# Patient Record
Sex: FEMALE | Race: WHITE | Employment: OTHER | URBAN - METROPOLITAN AREA
[De-identification: names, ages, dates, MRNs, and addresses within clinical notes are randomized per-mention and may not be internally consistent; named-entity substitution may affect disease eponyms.]

---

## 2017-04-19 PROBLEM — Z96.1 PSEUDOPHAKIA, BOTH EYES: Status: ACTIVE | Noted: 2017-04-19

## 2020-06-25 PROBLEM — H35.3131 EARLY DRY STAGE NONEXUDATIVE AGE-RELATED MACULAR DEGENERATION OF BOTH EYES: Status: ACTIVE | Noted: 2020-06-25

## 2022-01-27 ENCOUNTER — APPOINTMENT (OUTPATIENT)
Dept: CT IMAGING | Facility: HOSPITAL | Age: 87
End: 2022-01-27
Attending: EMERGENCY MEDICINE
Payer: MEDICARE

## 2022-01-27 ENCOUNTER — HOSPITAL ENCOUNTER (EMERGENCY)
Facility: HOSPITAL | Age: 87
Discharge: HOME | End: 2022-01-27
Attending: EMERGENCY MEDICINE
Payer: MEDICARE

## 2022-01-27 ENCOUNTER — APPOINTMENT (OUTPATIENT)
Dept: GENERAL RADIOLOGY | Facility: HOSPITAL | Age: 87
End: 2022-01-27
Attending: EMERGENCY MEDICINE
Payer: MEDICARE

## 2022-01-27 VITALS
SYSTOLIC BLOOD PRESSURE: 159 MMHG | OXYGEN SATURATION: 99 % | TEMPERATURE: 98 F | WEIGHT: 160 LBS | HEART RATE: 83 BPM | HEIGHT: 60 IN | RESPIRATION RATE: 17 BRPM | DIASTOLIC BLOOD PRESSURE: 78 MMHG | BODY MASS INDEX: 31.41 KG/M2

## 2022-01-27 DIAGNOSIS — R41.82 ALTERED MENTAL STATUS, UNSPECIFIED ALTERED MENTAL STATUS TYPE: Primary | ICD-10-CM

## 2022-01-27 DIAGNOSIS — H61.23 BILATERAL IMPACTED CERUMEN: ICD-10-CM

## 2022-01-27 LAB
ALBUMIN SERPL-MCNC: 3.7 G/DL (ref 3.4–5)
ALBUMIN/GLOB SERPL: 1 {RATIO} (ref 1–2)
ALP LIVER SERPL-CCNC: 87 U/L
ALT SERPL-CCNC: 21 U/L
ANION GAP SERPL CALC-SCNC: 4 MMOL/L (ref 0–18)
APTT PPP: 31.6 SECONDS (ref 23.3–35.6)
AST SERPL-CCNC: 17 U/L (ref 15–37)
BASOPHILS # BLD AUTO: 0.02 X10(3) UL (ref 0–0.2)
BASOPHILS NFR BLD AUTO: 0.4 %
BILIRUB SERPL-MCNC: 0.6 MG/DL (ref 0.1–2)
BILIRUB UR QL STRIP.AUTO: NEGATIVE
BUN BLD-MCNC: 18 MG/DL (ref 7–18)
CALCIUM BLD-MCNC: 9.8 MG/DL (ref 8.5–10.1)
CHLORIDE SERPL-SCNC: 106 MMOL/L (ref 98–112)
CLARITY UR REFRACT.AUTO: CLEAR
CO2 SERPL-SCNC: 27 MMOL/L (ref 21–32)
COLOR UR AUTO: YELLOW
CREAT BLD-MCNC: 0.95 MG/DL
EOSINOPHIL # BLD AUTO: 0.06 X10(3) UL (ref 0–0.7)
EOSINOPHIL NFR BLD AUTO: 1.2 %
ERYTHROCYTE [DISTWIDTH] IN BLOOD BY AUTOMATED COUNT: 13.2 %
GLOBULIN PLAS-MCNC: 3.6 G/DL (ref 2.8–4.4)
GLUCOSE BLD-MCNC: 140 MG/DL (ref 70–99)
GLUCOSE UR STRIP.AUTO-MCNC: NEGATIVE MG/DL
HCT VFR BLD AUTO: 40.6 %
HGB BLD-MCNC: 13.3 G/DL
IMM GRANULOCYTES # BLD AUTO: 0.02 X10(3) UL (ref 0–1)
IMM GRANULOCYTES NFR BLD: 0.4 %
INR BLD: 1.05 (ref 0.8–1.2)
KETONES UR STRIP.AUTO-MCNC: NEGATIVE MG/DL
LEUKOCYTE ESTERASE UR QL STRIP.AUTO: NEGATIVE
LYMPHOCYTES # BLD AUTO: 0.76 X10(3) UL (ref 1–4)
LYMPHOCYTES NFR BLD AUTO: 14.8 %
MCH RBC QN AUTO: 30.6 PG (ref 26–34)
MCHC RBC AUTO-ENTMCNC: 32.8 G/DL (ref 31–37)
MCV RBC AUTO: 93.5 FL
MONOCYTES # BLD AUTO: 0.58 X10(3) UL (ref 0.1–1)
MONOCYTES NFR BLD AUTO: 11.3 %
NEUTROPHILS # BLD AUTO: 3.68 X10 (3) UL (ref 1.5–7.7)
NEUTROPHILS # BLD AUTO: 3.68 X10(3) UL (ref 1.5–7.7)
NEUTROPHILS NFR BLD AUTO: 71.9 %
NITRITE UR QL STRIP.AUTO: NEGATIVE
OSMOLALITY SERPL CALC.SUM OF ELEC: 288 MOSM/KG (ref 275–295)
PH UR STRIP.AUTO: 6 [PH] (ref 5–8)
PLATELET # BLD AUTO: 225 10(3)UL (ref 150–450)
POTASSIUM SERPL-SCNC: 4.1 MMOL/L (ref 3.5–5.1)
PROT SERPL-MCNC: 7.3 G/DL (ref 6.4–8.2)
PROT UR STRIP.AUTO-MCNC: NEGATIVE MG/DL
PROTHROMBIN TIME: 13.7 SECONDS (ref 11.6–14.8)
RBC # BLD AUTO: 4.34 X10(6)UL
RBC UR QL AUTO: NEGATIVE
SARS-COV-2 RNA RESP QL NAA+PROBE: NOT DETECTED
SODIUM SERPL-SCNC: 137 MMOL/L (ref 136–145)
SP GR UR STRIP.AUTO: 1.02 (ref 1–1.03)
UROBILINOGEN UR STRIP.AUTO-MCNC: <2 MG/DL
WBC # BLD AUTO: 5.1 X10(3) UL (ref 4–11)

## 2022-01-27 PROCEDURE — 70450 CT HEAD/BRAIN W/O DYE: CPT | Performed by: EMERGENCY MEDICINE

## 2022-01-27 PROCEDURE — 99284 EMERGENCY DEPT VISIT MOD MDM: CPT

## 2022-01-27 PROCEDURE — 36415 COLL VENOUS BLD VENIPUNCTURE: CPT

## 2022-01-27 PROCEDURE — 85025 COMPLETE CBC W/AUTO DIFF WBC: CPT | Performed by: EMERGENCY MEDICINE

## 2022-01-27 PROCEDURE — 85610 PROTHROMBIN TIME: CPT | Performed by: EMERGENCY MEDICINE

## 2022-01-27 PROCEDURE — 80053 COMPREHEN METABOLIC PANEL: CPT | Performed by: EMERGENCY MEDICINE

## 2022-01-27 PROCEDURE — 71045 X-RAY EXAM CHEST 1 VIEW: CPT | Performed by: EMERGENCY MEDICINE

## 2022-01-27 PROCEDURE — 69209 REMOVE IMPACTED EAR WAX UNI: CPT

## 2022-01-27 PROCEDURE — 85730 THROMBOPLASTIN TIME PARTIAL: CPT | Performed by: EMERGENCY MEDICINE

## 2022-01-27 PROCEDURE — 81003 URINALYSIS AUTO W/O SCOPE: CPT | Performed by: EMERGENCY MEDICINE

## 2022-01-27 NOTE — ED QUICK NOTES
Pt reevaluated by er physician. Pt informed pt's kimberly Jessica Spears (on the phone) of pt's findings and plan of care.  Both verbalizing understanding

## 2022-01-27 NOTE — ED PROVIDER NOTES
Patient Seen in: [facility] Emergency Department      History   Patient presents with:  Altered Mental Status    Stated Complaint: AHMET [address] [phone]    Subjective:   HPI    Patient is a 51-year-old female presents from tobacco: Never Used    Alcohol use: Not on file    Drug use: Not on file             Review of Systems    Positive for stated complaint: GWEN ROJO HERNANDEZ [address] [phone]  Other systems are as noted in HPI.   Constitutional and vital signs rev person. Motor strength is 5 over 5 in all 4 extremities. There are no gross motor or sensory deficits appreciated. Cranial nerves II through XII are intact. Patient is answering all questions appropriately.              ED Course     Labs Reviewed   COMP ME vessel ischemic disease. Vascular calcifications are noted. No mass effect. Visualized portions of paranasal sinuses are unremarkable. Visualized portions of the mastoid air cells are unremarkable. Visualized portions of the orbits are unremarkable.  Avelino Prado BUN/creatinine, and blood sugar all of which are unremarkable. Liver function tests are unremarkable. Patient's urinalysis is unremarkable. Patient's coags are unremarkable. The patient's Covid test is found to be negative.  Patient was sitting up in breath [address] [facility]  [phone]    Call in 2 days            Medications Prescribed:  Current Discharge Medication List

## 2022-01-27 NOTE — ED QUICK NOTES
Pt's both ears irrigated with saline and peroxide. Wax removed from both and pt verbalized she can hear better.

## 2022-01-27 NOTE — ED QUICK NOTES
Pt reevaluated by dr. Jaylen Sawyer, pt's son at bedside. Pt for discharge.  Plan of care explained to both and verbalizing understanding

## 2022-12-26 ENCOUNTER — HOSPITAL ENCOUNTER (EMERGENCY)
Facility: HOSPITAL | Age: 87
Discharge: HOME | End: 2022-12-26
Attending: EMERGENCY MEDICINE
Payer: MEDICARE

## 2022-12-26 ENCOUNTER — APPOINTMENT (OUTPATIENT)
Dept: CT IMAGING | Facility: HOSPITAL | Age: 87
End: 2022-12-26
Attending: EMERGENCY MEDICINE
Payer: MEDICARE

## 2022-12-26 VITALS
BODY MASS INDEX: 28.32 KG/M2 | RESPIRATION RATE: 20 BRPM | HEIGHT: 61 IN | HEART RATE: 87 BPM | WEIGHT: 150 LBS | TEMPERATURE: 98 F | DIASTOLIC BLOOD PRESSURE: 71 MMHG | OXYGEN SATURATION: 100 % | SYSTOLIC BLOOD PRESSURE: 150 MMHG

## 2022-12-26 DIAGNOSIS — S00.03XA HEMATOMA OF SCALP, INITIAL ENCOUNTER: ICD-10-CM

## 2022-12-26 DIAGNOSIS — R19.7 DIARRHEA, UNSPECIFIED TYPE: Primary | ICD-10-CM

## 2022-12-26 DIAGNOSIS — E86.0 MILD DEHYDRATION: ICD-10-CM

## 2022-12-26 LAB
ALBUMIN SERPL-MCNC: 3.6 G/DL (ref 3.4–5)
ALBUMIN/GLOB SERPL: 1.1 {RATIO} (ref 1–2)
ALP LIVER SERPL-CCNC: 120 U/L
ALT SERPL-CCNC: 17 U/L
ANION GAP SERPL CALC-SCNC: 2 MMOL/L (ref 0–18)
AST SERPL-CCNC: 28 U/L (ref 15–37)
BASOPHILS # BLD AUTO: 0.03 X10(3) UL (ref 0–0.2)
BASOPHILS NFR BLD AUTO: 0.4 %
BILIRUB SERPL-MCNC: 0.4 MG/DL (ref 0.1–2)
BUN BLD-MCNC: 22 MG/DL (ref 7–18)
CALCIUM BLD-MCNC: 9.1 MG/DL (ref 8.5–10.1)
CHLORIDE SERPL-SCNC: 109 MMOL/L (ref 98–112)
CO2 SERPL-SCNC: 28 MMOL/L (ref 21–32)
CREAT BLD-MCNC: 1.04 MG/DL
EOSINOPHIL # BLD AUTO: 0.04 X10(3) UL (ref 0–0.7)
EOSINOPHIL NFR BLD AUTO: 0.5 %
ERYTHROCYTE [DISTWIDTH] IN BLOOD BY AUTOMATED COUNT: 12.9 %
GFR SERPLBLD BASED ON 1.73 SQ M-ARVRAT: 52 ML/MIN/1.73M2 (ref 60–?)
GLOBULIN PLAS-MCNC: 3.4 G/DL (ref 2.8–4.4)
GLUCOSE BLD-MCNC: 87 MG/DL (ref 70–99)
HCT VFR BLD AUTO: 42.6 %
HGB BLD-MCNC: 13.8 G/DL
IMM GRANULOCYTES # BLD AUTO: 0.02 X10(3) UL (ref 0–1)
IMM GRANULOCYTES NFR BLD: 0.3 %
LYMPHOCYTES # BLD AUTO: 0.68 X10(3) UL (ref 1–4)
LYMPHOCYTES NFR BLD AUTO: 8.5 %
MCH RBC QN AUTO: 31.2 PG (ref 26–34)
MCHC RBC AUTO-ENTMCNC: 32.4 G/DL (ref 31–37)
MCV RBC AUTO: 96.2 FL
MONOCYTES # BLD AUTO: 0.86 X10(3) UL (ref 0.1–1)
MONOCYTES NFR BLD AUTO: 10.8 %
NEUTROPHILS # BLD AUTO: 6.37 X10 (3) UL (ref 1.5–7.7)
NEUTROPHILS # BLD AUTO: 6.37 X10(3) UL (ref 1.5–7.7)
NEUTROPHILS NFR BLD AUTO: 79.5 %
OSMOLALITY SERPL CALC.SUM OF ELEC: 291 MOSM/KG (ref 275–295)
PLATELET # BLD AUTO: 196 10(3)UL (ref 150–450)
POTASSIUM SERPL-SCNC: 4.2 MMOL/L (ref 3.5–5.1)
PROT SERPL-MCNC: 7 G/DL (ref 6.4–8.2)
RBC # BLD AUTO: 4.43 X10(6)UL
SODIUM SERPL-SCNC: 139 MMOL/L (ref 136–145)
WBC # BLD AUTO: 8 X10(3) UL (ref 4–11)

## 2022-12-26 PROCEDURE — 70450 CT HEAD/BRAIN W/O DYE: CPT | Performed by: EMERGENCY MEDICINE

## 2022-12-26 PROCEDURE — 99284 EMERGENCY DEPT VISIT MOD MDM: CPT

## 2022-12-26 PROCEDURE — 96360 HYDRATION IV INFUSION INIT: CPT

## 2022-12-26 PROCEDURE — 80053 COMPREHEN METABOLIC PANEL: CPT | Performed by: EMERGENCY MEDICINE

## 2022-12-26 PROCEDURE — 85025 COMPLETE CBC W/AUTO DIFF WBC: CPT | Performed by: EMERGENCY MEDICINE

## 2022-12-26 RX ORDER — LOPERAMIDE HYDROCHLORIDE 2 MG/1
2 TABLET ORAL AS NEEDED
Qty: 20 TABLET | Refills: 0 | Status: SHIPPED | OUTPATIENT
Start: 2022-12-26 | End: 2023-01-25

## 2022-12-26 RX ORDER — LOPERAMIDE HYDROCHLORIDE 2 MG/1
2 CAPSULE ORAL ONCE
Status: COMPLETED | OUTPATIENT
Start: 2022-12-26 | End: 2022-12-26

## 2022-12-26 NOTE — ED INITIAL ASSESSMENT (HPI)
Diarrhea x 1 episode PTA.  Pt states \" I feel weak, I can't walk\" denies any abdominal pain, or other complaints

## 2022-12-29 ENCOUNTER — APPOINTMENT (OUTPATIENT)
Dept: GENERAL RADIOLOGY | Facility: HOSPITAL | Age: 87
End: 2022-12-29
Attending: EMERGENCY MEDICINE
Payer: MEDICARE

## 2022-12-29 ENCOUNTER — HOSPITAL ENCOUNTER (EMERGENCY)
Facility: HOSPITAL | Age: 87
Discharge: HOME | End: 2022-12-29
Attending: EMERGENCY MEDICINE
Payer: MEDICARE

## 2022-12-29 VITALS
HEIGHT: 61 IN | BODY MASS INDEX: 28.31 KG/M2 | HEART RATE: 90 BPM | WEIGHT: 149.94 LBS | SYSTOLIC BLOOD PRESSURE: 180 MMHG | RESPIRATION RATE: 26 BRPM | DIASTOLIC BLOOD PRESSURE: 80 MMHG | OXYGEN SATURATION: 100 % | TEMPERATURE: 98 F

## 2022-12-29 DIAGNOSIS — R53.1 WEAKNESS GENERALIZED: Primary | ICD-10-CM

## 2022-12-29 LAB
ANION GAP SERPL CALC-SCNC: 6 MMOL/L (ref 0–18)
ATRIAL RATE: 89 BPM
BASOPHILS # BLD AUTO: 0.02 X10(3) UL (ref 0–0.2)
BASOPHILS NFR BLD AUTO: 0.2 %
BILIRUB UR QL CFM: NEGATIVE
BUN BLD-MCNC: 36 MG/DL (ref 7–18)
CALCIUM BLD-MCNC: 10.4 MG/DL (ref 8.5–10.1)
CHLORIDE SERPL-SCNC: 107 MMOL/L (ref 98–112)
CHOLEST SERPL-MCNC: 213 MG/DL (ref ?–200)
CO2 SERPL-SCNC: 27 MMOL/L (ref 21–32)
CREAT BLD-MCNC: 1.03 MG/DL
EOSINOPHIL # BLD AUTO: 0.04 X10(3) UL (ref 0–0.7)
EOSINOPHIL NFR BLD AUTO: 0.5 %
ERYTHROCYTE [DISTWIDTH] IN BLOOD BY AUTOMATED COUNT: 13.1 %
GFR SERPLBLD BASED ON 1.73 SQ M-ARVRAT: 53 ML/MIN/1.73M2 (ref 60–?)
GLUCOSE BLD-MCNC: 117 MG/DL (ref 70–99)
GLUCOSE BLD-MCNC: 94 MG/DL (ref 70–99)
GLUCOSE UR STRIP.AUTO-MCNC: NEGATIVE MG/DL
HCT VFR BLD AUTO: 45.1 %
HDLC SERPL-MCNC: 85 MG/DL (ref 40–59)
HGB BLD-MCNC: 14.8 G/DL
HYALINE CASTS #/AREA URNS AUTO: PRESENT /LPF
HYALINE CASTS #/AREA URNS AUTO: PRESENT /LPF
IMM GRANULOCYTES # BLD AUTO: 0.04 X10(3) UL (ref 0–1)
IMM GRANULOCYTES NFR BLD: 0.5 %
KETONES UR STRIP.AUTO-MCNC: 15 MG/DL
LDLC SERPL CALC-MCNC: 113 MG/DL (ref ?–100)
LYMPHOCYTES # BLD AUTO: 0.69 X10(3) UL (ref 1–4)
LYMPHOCYTES NFR BLD AUTO: 8.3 %
MCH RBC QN AUTO: 30.9 PG (ref 26–34)
MCHC RBC AUTO-ENTMCNC: 32.8 G/DL (ref 31–37)
MCV RBC AUTO: 94.2 FL
MONOCYTES # BLD AUTO: 0.79 X10(3) UL (ref 0.1–1)
MONOCYTES NFR BLD AUTO: 9.5 %
NEUTROPHILS # BLD AUTO: 6.71 X10 (3) UL (ref 1.5–7.7)
NEUTROPHILS # BLD AUTO: 6.71 X10(3) UL (ref 1.5–7.7)
NEUTROPHILS NFR BLD AUTO: 81 %
NITRITE UR QL STRIP.AUTO: NEGATIVE
NONHDLC SERPL-MCNC: 128 MG/DL (ref ?–130)
OSMOLALITY SERPL CALC.SUM OF ELEC: 299 MOSM/KG (ref 275–295)
P AXIS: 55 DEGREES
P-R INTERVAL: 170 MS
PH UR STRIP.AUTO: 5.5 [PH] (ref 5–8)
PLATELET # BLD AUTO: 220 10(3)UL (ref 150–450)
POTASSIUM SERPL-SCNC: 4 MMOL/L (ref 3.5–5.1)
Q-T INTERVAL: 370 MS
QRS DURATION: 76 MS
QTC CALCULATION (BEZET): 450 MS
R AXIS: -25 DEGREES
RBC # BLD AUTO: 4.79 X10(6)UL
SARS-COV-2 RNA RESP QL NAA+PROBE: NOT DETECTED
SODIUM SERPL-SCNC: 140 MMOL/L (ref 136–145)
SP GR UR STRIP.AUTO: >=1.03 (ref 1–1.03)
T AXIS: 2 DEGREES
TRIGL SERPL-MCNC: 83 MG/DL (ref 30–149)
TROPONIN I HIGH SENSITIVITY: 16 NG/L
TSI SER-ACNC: 2.22 MIU/ML (ref 0.36–3.74)
UROBILINOGEN UR STRIP.AUTO-MCNC: 0.2 MG/DL
VENTRICULAR RATE: 89 BPM
VLDLC SERPL CALC-MCNC: 14 MG/DL (ref 0–30)
WBC # BLD AUTO: 8.3 X10(3) UL (ref 4–11)

## 2022-12-29 PROCEDURE — 80048 BASIC METABOLIC PNL TOTAL CA: CPT | Performed by: EMERGENCY MEDICINE

## 2022-12-29 PROCEDURE — 93005 ELECTROCARDIOGRAM TRACING: CPT

## 2022-12-29 PROCEDURE — 85025 COMPLETE CBC W/AUTO DIFF WBC: CPT | Performed by: EMERGENCY MEDICINE

## 2022-12-29 PROCEDURE — 93010 ELECTROCARDIOGRAM REPORT: CPT

## 2022-12-29 PROCEDURE — 80061 LIPID PANEL: CPT | Performed by: EMERGENCY MEDICINE

## 2022-12-29 PROCEDURE — 82962 GLUCOSE BLOOD TEST: CPT

## 2022-12-29 PROCEDURE — 87086 URINE CULTURE/COLONY COUNT: CPT | Performed by: EMERGENCY MEDICINE

## 2022-12-29 PROCEDURE — 84443 ASSAY THYROID STIM HORMONE: CPT | Performed by: EMERGENCY MEDICINE

## 2022-12-29 PROCEDURE — 84484 ASSAY OF TROPONIN QUANT: CPT | Performed by: EMERGENCY MEDICINE

## 2022-12-29 PROCEDURE — 96361 HYDRATE IV INFUSION ADD-ON: CPT

## 2022-12-29 PROCEDURE — 99285 EMERGENCY DEPT VISIT HI MDM: CPT

## 2022-12-29 PROCEDURE — 72100 X-RAY EXAM L-S SPINE 2/3 VWS: CPT | Performed by: EMERGENCY MEDICINE

## 2022-12-29 PROCEDURE — 81015 MICROSCOPIC EXAM OF URINE: CPT | Performed by: EMERGENCY MEDICINE

## 2022-12-29 PROCEDURE — 96360 HYDRATION IV INFUSION INIT: CPT

## 2022-12-29 PROCEDURE — 71045 X-RAY EXAM CHEST 1 VIEW: CPT | Performed by: EMERGENCY MEDICINE

## 2022-12-29 PROCEDURE — 81001 URINALYSIS AUTO W/SCOPE: CPT | Performed by: EMERGENCY MEDICINE

## 2022-12-29 NOTE — CM/SW NOTE
Spoke with patient and patient's son Kaylyn Rod, at bedside, regarding patient's current living situation. Patient lives in [facility] independent living. Patient uses a walker normally but patient was found in chair after whole day unable to get out of chair. Family alerted when patient did not come down for meals and patient found in chair unable to stand up. Patient is in need of rehab and they are requesting [address] [place] [address] rehab as a 1st choice for rehab. EDCM to send referral via [employer]. Medical decision making as documented by myself and/or resident/fellow in patient's chart. - Carley Almanza MD

## 2022-12-29 NOTE — CM/SW NOTE
[facility] arranged to transport patient to [facility]. ETA 6:00pm    PCS form completed iin [facility].     Receiving RN :[phone]

## 2022-12-29 NOTE — CM/SW NOTE
Spoke with Maxine Phillips, admissions at [facility], and patient accepted to [facility], as requested by patient and family. Patient lives in [address]

## 2022-12-29 NOTE — ED INITIAL ASSESSMENT (HPI)
PT states that she has been feeling very weak for several days, unable to walk for several days. PT states that her son called EMS as they are concerned about her.

## 2023-03-10 ENCOUNTER — HOSPITAL ENCOUNTER (EMERGENCY)
Facility: HOSPITAL | Age: 88
Discharge: HOME | End: 2023-03-10
Attending: EMERGENCY MEDICINE
Payer: MEDICARE

## 2023-03-10 VITALS
OXYGEN SATURATION: 99 % | SYSTOLIC BLOOD PRESSURE: 158 MMHG | HEIGHT: 62 IN | BODY MASS INDEX: 28.52 KG/M2 | WEIGHT: 155 LBS | HEART RATE: 67 BPM | RESPIRATION RATE: 20 BRPM | DIASTOLIC BLOOD PRESSURE: 85 MMHG

## 2023-03-10 DIAGNOSIS — R32 URINARY INCONTINENCE, UNSPECIFIED TYPE: Primary | ICD-10-CM

## 2023-03-10 LAB
BILIRUB UR QL STRIP.AUTO: NEGATIVE
CLARITY UR REFRACT.AUTO: CLEAR
COLOR UR AUTO: YELLOW
GLUCOSE UR STRIP.AUTO-MCNC: NEGATIVE MG/DL
KETONES UR STRIP.AUTO-MCNC: NEGATIVE MG/DL
LEUKOCYTE ESTERASE UR QL STRIP.AUTO: NEGATIVE
NITRITE UR QL STRIP.AUTO: NEGATIVE
PH UR STRIP.AUTO: 5 [PH] (ref 5–8)
PROT UR STRIP.AUTO-MCNC: NEGATIVE MG/DL
SP GR UR STRIP.AUTO: 1.01 (ref 1–1.03)
UROBILINOGEN UR STRIP.AUTO-MCNC: <2 MG/DL

## 2023-03-10 PROCEDURE — 99283 EMERGENCY DEPT VISIT LOW MDM: CPT

## 2023-03-10 PROCEDURE — 51798 US URINE CAPACITY MEASURE: CPT

## 2023-03-10 PROCEDURE — 81001 URINALYSIS AUTO W/SCOPE: CPT | Performed by: EMERGENCY MEDICINE

## 2023-03-10 NOTE — ED INITIAL ASSESSMENT (HPI)
This RN attempted to call report for patient at [facility].  No answer at this time

## 2023-03-10 NOTE — ED QUICK NOTES
Rounding Completed    Plan of Care reviewed. Waiting for urine results. Elimination needs assessed- Pt on purewick  Pt resting comfortably on cot. Bed is locked and in lowest position. Call light within reach.

## 2023-03-21 ENCOUNTER — HOSPITAL ENCOUNTER (INPATIENT)
Facility: HOSPITAL | Age: 88
LOS: 2 days | Discharge: NURSING, REHAB OR LONG-TERM CARE | End: 2023-03-23
Attending: EMERGENCY MEDICINE | Admitting: HOSPITALIST
Payer: MEDICARE

## 2023-03-21 ENCOUNTER — APPOINTMENT (OUTPATIENT)
Dept: CT IMAGING | Facility: HOSPITAL | Age: 88
End: 2023-03-21
Attending: EMERGENCY MEDICINE
Payer: MEDICARE

## 2023-03-21 ENCOUNTER — ANESTHESIA (OUTPATIENT)
Dept: SURGERY | Facility: HOSPITAL | Age: 88
End: 2023-03-21
Payer: MEDICARE

## 2023-03-21 ENCOUNTER — APPOINTMENT (OUTPATIENT)
Dept: CT IMAGING | Facility: HOSPITAL | Age: 88
DRG: 117 | End: 2023-03-21
Attending: EMERGENCY MEDICINE
Payer: MEDICARE

## 2023-03-21 ENCOUNTER — ANESTHESIA EVENT (OUTPATIENT)
Dept: SURGERY | Facility: HOSPITAL | Age: 88
End: 2023-03-21
Payer: MEDICARE

## 2023-03-21 ENCOUNTER — APPOINTMENT (OUTPATIENT)
Dept: GENERAL RADIOLOGY | Facility: HOSPITAL | Age: 88
End: 2023-03-21
Attending: INTERNAL MEDICINE
Payer: MEDICARE

## 2023-03-21 ENCOUNTER — APPOINTMENT (OUTPATIENT)
Dept: GENERAL RADIOLOGY | Facility: HOSPITAL | Age: 88
DRG: 117 | End: 2023-03-21
Attending: EMERGENCY MEDICINE
Payer: MEDICARE

## 2023-03-21 ENCOUNTER — APPOINTMENT (OUTPATIENT)
Dept: GENERAL RADIOLOGY | Facility: HOSPITAL | Age: 88
End: 2023-03-21
Attending: EMERGENCY MEDICINE
Payer: MEDICARE

## 2023-03-21 ENCOUNTER — APPOINTMENT (OUTPATIENT)
Dept: GENERAL RADIOLOGY | Facility: HOSPITAL | Age: 88
DRG: 117 | End: 2023-03-21
Attending: INTERNAL MEDICINE
Payer: MEDICARE

## 2023-03-21 ENCOUNTER — HOSPITAL ENCOUNTER (INPATIENT)
Facility: HOSPITAL | Age: 88
LOS: 2 days | Discharge: NURSING, REHAB OR LONG-TERM CARE | DRG: 117 | End: 2023-03-23
Attending: EMERGENCY MEDICINE | Admitting: HOSPITALIST
Payer: MEDICARE

## 2023-03-21 DIAGNOSIS — S05.31XA RUPTURED GLOBE OF RIGHT EYE, INITIAL ENCOUNTER: ICD-10-CM

## 2023-03-21 DIAGNOSIS — S00.83XA CONTUSION OF FACE, INITIAL ENCOUNTER: ICD-10-CM
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
## 2023-03-21 LAB
ALBUMIN SERPL-MCNC: 3.7 G/DL (ref 3.4–5)
ALBUMIN/GLOB SERPL: 1.1 {RATIO} (ref 1–2)
ALP LIVER SERPL-CCNC: 82 U/L
ALT SERPL-CCNC: 18 U/L
ANION GAP SERPL CALC-SCNC: 6 MMOL/L (ref 0–18)
AST SERPL-CCNC: 30 U/L (ref 15–37)
ATRIAL RATE: 95 BPM
BASOPHILS # BLD AUTO: 0.04 X10(3) UL (ref 0–0.2)
BASOPHILS NFR BLD AUTO: 0.4 %
BILIRUB SERPL-MCNC: 0.8 MG/DL (ref 0.1–2)
BILIRUB UR QL STRIP.AUTO: NEGATIVE
BUN BLD-MCNC: 21 MG/DL (ref 7–18)
CALCIUM BLD-MCNC: 9.4 MG/DL (ref 8.5–10.1)
CHLORIDE SERPL-SCNC: 107 MMOL/L (ref 98–112)
CLARITY UR REFRACT.AUTO: CLEAR
CO2 SERPL-SCNC: 26 MMOL/L (ref 21–32)
COLOR UR AUTO: YELLOW
CREAT BLD-MCNC: 0.86 MG/DL
EOSINOPHIL # BLD AUTO: 0 X10(3) UL (ref 0–0.7)
EOSINOPHIL NFR BLD AUTO: 0 %
ERYTHROCYTE [DISTWIDTH] IN BLOOD BY AUTOMATED COUNT: 13.4 %
GFR SERPLBLD BASED ON 1.73 SQ M-ARVRAT: 65 ML/MIN/1.73M2 (ref 60–?)
GLOBULIN PLAS-MCNC: 3.3 G/DL (ref 2.8–4.4)
GLUCOSE BLD-MCNC: 130 MG/DL (ref 70–99)
GLUCOSE UR STRIP.AUTO-MCNC: NEGATIVE MG/DL
HCT VFR BLD AUTO: 41 %
HGB BLD-MCNC: 13.6 G/DL
IMM GRANULOCYTES # BLD AUTO: 0.04 X10(3) UL (ref 0–1)
IMM GRANULOCYTES NFR BLD: 0.4 %
LEUKOCYTE ESTERASE UR QL STRIP.AUTO: NEGATIVE
LYMPHOCYTES # BLD AUTO: 0.68 X10(3) UL (ref 1–4)
LYMPHOCYTES NFR BLD AUTO: 6.6 %
MCH RBC QN AUTO: 31.1 PG (ref 26–34)
MCHC RBC AUTO-ENTMCNC: 33.2 G/DL (ref 31–37)
MCV RBC AUTO: 93.8 FL
MONOCYTES # BLD AUTO: 1.17 X10(3) UL (ref 0.1–1)
MONOCYTES NFR BLD AUTO: 11.4 %
NEUTROPHILS # BLD AUTO: 8.36 X10 (3) UL (ref 1.5–7.7)
NEUTROPHILS # BLD AUTO: 8.36 X10(3) UL (ref 1.5–7.7)
NEUTROPHILS NFR BLD AUTO: 81.2 %
NITRITE UR QL STRIP.AUTO: NEGATIVE
OSMOLALITY SERPL CALC.SUM OF ELEC: 293 MOSM/KG (ref 275–295)
P AXIS: 51 DEGREES
P-R INTERVAL: 184 MS
PH UR STRIP.AUTO: 5 [PH] (ref 5–8)
PLATELET # BLD AUTO: 212 10(3)UL (ref 150–450)
POTASSIUM SERPL-SCNC: 3.9 MMOL/L (ref 3.5–5.1)
PROT SERPL-MCNC: 7 G/DL (ref 6.4–8.2)
PROT UR STRIP.AUTO-MCNC: NEGATIVE MG/DL
Q-T INTERVAL: 384 MS
QRS DURATION: 76 MS
QTC CALCULATION (BEZET): 482 MS
R AXIS: -26 DEGREES
RBC # BLD AUTO: 4.37 X10(6)UL
SARS-COV-2 RNA RESP QL NAA+PROBE: NOT DETECTED
SODIUM SERPL-SCNC: 139 MMOL/L (ref 136–145)
SP GR UR STRIP.AUTO: 1.02 (ref 1–1.03)
T AXIS: 8 DEGREES
TROPONIN I HIGH SENSITIVITY: 16 NG/L
UROBILINOGEN UR STRIP.AUTO-MCNC: <2 MG/DL
VENTRICULAR RATE: 95 BPM
WBC # BLD AUTO: 10.3 X10(3) UL (ref 4–11)

## 2023-03-21 PROCEDURE — 99223 1ST HOSP IP/OBS HIGH 75: CPT | Performed by: INTERNAL MEDICINE

## 2023-03-21 PROCEDURE — 73560 X-RAY EXAM OF KNEE 1 OR 2: CPT | Performed by: INTERNAL MEDICINE

## 2023-03-21 PROCEDURE — 76376 3D RENDER W/INTRP POSTPROCES: CPT | Performed by: EMERGENCY MEDICINE

## 2023-03-21 PROCEDURE — 70450 CT HEAD/BRAIN W/O DYE: CPT | Performed by: EMERGENCY MEDICINE

## 2023-03-21 PROCEDURE — 71045 X-RAY EXAM CHEST 1 VIEW: CPT | Performed by: EMERGENCY MEDICINE

## 2023-03-21 PROCEDURE — 08B43ZZ EXCISION OF RIGHT VITREOUS, PERCUTANEOUS APPROACH: ICD-10-PCS | Performed by: STUDENT IN AN ORGANIZED HEALTH CARE EDUCATION/TRAINING PROGRAM

## 2023-03-21 PROCEDURE — 08Q6XZZ REPAIR RIGHT SCLERA, EXTERNAL APPROACH: ICD-10-PCS | Performed by: STUDENT IN AN ORGANIZED HEALTH CARE EDUCATION/TRAINING PROGRAM

## 2023-03-21 PROCEDURE — 70486 CT MAXILLOFACIAL W/O DYE: CPT | Performed by: EMERGENCY MEDICINE

## 2023-03-21 PROCEDURE — 72125 CT NECK SPINE W/O DYE: CPT | Performed by: EMERGENCY MEDICINE

## 2023-03-21 RX ORDER — GENTAMICIN 10 MG/ML
30 INJECTION, SOLUTION INTRAMUSCULAR; INTRAVENOUS ONCE
Status: DISCONTINUED | OUTPATIENT
Start: 2023-03-21 | End: 2023-03-21 | Stop reason: HOSPADM

## 2023-03-21 RX ORDER — SODIUM CHLORIDE, SODIUM LACTATE, POTASSIUM CHLORIDE, CALCIUM CHLORIDE 600; 310; 30; 20 MG/100ML; MG/100ML; MG/100ML; MG/100ML
INJECTION, SOLUTION INTRAVENOUS CONTINUOUS PRN
Status: DISCONTINUED | OUTPATIENT
Start: 2023-03-21 | End: 2023-03-21 | Stop reason: SURG

## 2023-03-21 RX ORDER — DEXAMETHASONE SODIUM PHOSPHATE 4 MG/ML
VIAL (ML) INJECTION AS NEEDED
Status: DISCONTINUED | OUTPATIENT
Start: 2023-03-21 | End: 2023-03-21 | Stop reason: SURG

## 2023-03-21 RX ORDER — MELATONIN
3 NIGHTLY PRN
Status: DISCONTINUED | OUTPATIENT
Start: 2023-03-21 | End: 2023-03-23

## 2023-03-21 RX ORDER — NALOXONE HYDROCHLORIDE 0.4 MG/ML
80 INJECTION, SOLUTION INTRAMUSCULAR; INTRAVENOUS; SUBCUTANEOUS AS NEEDED
Status: DISCONTINUED | OUTPATIENT
Start: 2023-03-21 | End: 2023-03-22 | Stop reason: HOSPADM

## 2023-03-21 RX ORDER — MIDAZOLAM HYDROCHLORIDE 1 MG/ML
1 INJECTION INTRAMUSCULAR; INTRAVENOUS EVERY 5 MIN PRN
Status: COMPLETED | OUTPATIENT
Start: 2023-03-21 | End: 2023-03-21

## 2023-03-21 RX ORDER — METOCLOPRAMIDE HYDROCHLORIDE 5 MG/ML
5 INJECTION INTRAMUSCULAR; INTRAVENOUS EVERY 8 HOURS PRN
Status: DISCONTINUED | OUTPATIENT
Start: 2023-03-21 | End: 2023-03-23

## 2023-03-21 RX ORDER — LABETALOL HYDROCHLORIDE 5 MG/ML
10 INJECTION, SOLUTION INTRAVENOUS EVERY 10 MIN PRN
Status: DISCONTINUED | OUTPATIENT
Start: 2023-03-21 | End: 2023-03-22 | Stop reason: HOSPADM

## 2023-03-21 RX ORDER — LIDOCAINE HYDROCHLORIDE 40 MG/ML
SOLUTION TOPICAL AS NEEDED
Status: DISCONTINUED | OUTPATIENT
Start: 2023-03-21 | End: 2023-03-21 | Stop reason: SURG

## 2023-03-21 RX ORDER — ATORVASTATIN CALCIUM 10 MG/1
10 TABLET, FILM COATED ORAL NIGHTLY
Status: DISCONTINUED | OUTPATIENT
Start: 2023-03-21 | End: 2023-03-23

## 2023-03-21 RX ORDER — SENNOSIDES 8.6 MG
17.2 TABLET ORAL NIGHTLY PRN
Status: DISCONTINUED | OUTPATIENT
Start: 2023-03-21 | End: 2023-03-23

## 2023-03-21 RX ORDER — ONDANSETRON 2 MG/ML
4 INJECTION INTRAMUSCULAR; INTRAVENOUS EVERY 6 HOURS PRN
Status: DISCONTINUED | OUTPATIENT
Start: 2023-03-21 | End: 2023-03-22 | Stop reason: HOSPADM

## 2023-03-21 RX ORDER — METOPROLOL TARTRATE 5 MG/5ML
2.5 INJECTION INTRAVENOUS ONCE
Status: DISCONTINUED | OUTPATIENT
Start: 2023-03-21 | End: 2023-03-22 | Stop reason: HOSPADM

## 2023-03-21 RX ORDER — POLYETHYLENE GLYCOL 3350 17 G/17G
17 POWDER, FOR SOLUTION ORAL DAILY PRN
Status: DISCONTINUED | OUTPATIENT
Start: 2023-03-21 | End: 2023-03-23

## 2023-03-21 RX ORDER — ESMOLOL HYDROCHLORIDE 10 MG/ML
INJECTION INTRAVENOUS AS NEEDED
Status: DISCONTINUED | OUTPATIENT
Start: 2023-03-21 | End: 2023-03-21 | Stop reason: SURG

## 2023-03-21 RX ORDER — HYDROMORPHONE HYDROCHLORIDE 1 MG/ML
0.5 INJECTION, SOLUTION INTRAMUSCULAR; INTRAVENOUS; SUBCUTANEOUS EVERY 30 MIN PRN
Status: ACTIVE | OUTPATIENT
Start: 2023-03-21 | End: 2023-03-21

## 2023-03-21 RX ORDER — ACETAMINOPHEN 500 MG
1000 TABLET ORAL ONCE AS NEEDED
Status: ACTIVE | OUTPATIENT
Start: 2023-03-21 | End: 2023-03-21

## 2023-03-21 RX ORDER — SODIUM PHOSPHATE, DIBASIC AND SODIUM PHOSPHATE, MONOBASIC 7; 19 G/133ML; G/133ML
1 ENEMA RECTAL ONCE AS NEEDED
Status: DISCONTINUED | OUTPATIENT
Start: 2023-03-21 | End: 2023-03-23

## 2023-03-21 RX ORDER — LIDOCAINE HYDROCHLORIDE 10 MG/ML
INJECTION, SOLUTION EPIDURAL; INFILTRATION; INTRACAUDAL; PERINEURAL AS NEEDED
Status: DISCONTINUED | OUTPATIENT
Start: 2023-03-21 | End: 2023-03-21 | Stop reason: SURG

## 2023-03-21 RX ORDER — TRIAMCINOLONE ACETONIDE 40 MG/ML
40 INJECTION, SUSPENSION INTRA-ARTICULAR; INTRAMUSCULAR ONCE
Status: COMPLETED | OUTPATIENT
Start: 2023-03-21 | End: 2023-03-21

## 2023-03-21 RX ORDER — ROCURONIUM BROMIDE 10 MG/ML
INJECTION, SOLUTION INTRAVENOUS AS NEEDED
Status: DISCONTINUED | OUTPATIENT
Start: 2023-03-21 | End: 2023-03-21 | Stop reason: SURG

## 2023-03-21 RX ORDER — ONDANSETRON 2 MG/ML
4 INJECTION INTRAMUSCULAR; INTRAVENOUS EVERY 4 HOURS PRN
Status: ACTIVE | OUTPATIENT
Start: 2023-03-21 | End: 2023-03-21

## 2023-03-21 RX ORDER — METOCLOPRAMIDE HYDROCHLORIDE 5 MG/ML
5 INJECTION INTRAMUSCULAR; INTRAVENOUS EVERY 8 HOURS PRN
Status: DISCONTINUED | OUTPATIENT
Start: 2023-03-21 | End: 2023-03-22 | Stop reason: HOSPADM

## 2023-03-21 RX ORDER — HYDROCODONE BITARTRATE AND ACETAMINOPHEN 5; 325 MG/1; MG/1
1 TABLET ORAL EVERY 4 HOURS PRN
Status: DISCONTINUED | OUTPATIENT
Start: 2023-03-21 | End: 2023-03-23

## 2023-03-21 RX ORDER — HYDROCODONE BITARTRATE AND ACETAMINOPHEN 5; 325 MG/1; MG/1
2 TABLET ORAL EVERY 4 HOURS PRN
Status: DISCONTINUED | OUTPATIENT
Start: 2023-03-21 | End: 2023-03-23

## 2023-03-21 RX ORDER — ACETAMINOPHEN 325 MG/1
650 TABLET ORAL EVERY 4 HOURS PRN
Status: DISCONTINUED | OUTPATIENT
Start: 2023-03-21 | End: 2023-03-23

## 2023-03-21 RX ORDER — METOPROLOL SUCCINATE 50 MG/1
50 TABLET, EXTENDED RELEASE ORAL DAILY
Status: DISCONTINUED | OUTPATIENT
Start: 2023-03-21 | End: 2023-03-23

## 2023-03-21 RX ORDER — TRIAMCINOLONE ACETONIDE 40 MG/ML
INJECTION, SUSPENSION INTRA-ARTICULAR; INTRAMUSCULAR AS NEEDED
Status: DISCONTINUED | OUTPATIENT
Start: 2023-03-21 | End: 2023-03-21 | Stop reason: HOSPADM

## 2023-03-21 RX ORDER — CEFAZOLIN SODIUM 1 G/3ML
INJECTION, POWDER, FOR SOLUTION INTRAMUSCULAR; INTRAVENOUS AS NEEDED
Status: DISCONTINUED | OUTPATIENT
Start: 2023-03-21 | End: 2023-03-21 | Stop reason: HOSPADM

## 2023-03-21 RX ORDER — HYDROMORPHONE HYDROCHLORIDE 1 MG/ML
INJECTION, SOLUTION INTRAMUSCULAR; INTRAVENOUS; SUBCUTANEOUS
Status: COMPLETED
Start: 2023-03-21 | End: 2023-03-21

## 2023-03-21 RX ORDER — LABETALOL HYDROCHLORIDE 5 MG/ML
INJECTION, SOLUTION INTRAVENOUS
Status: COMPLETED
Start: 2023-03-21 | End: 2023-03-21

## 2023-03-21 RX ORDER — BISACODYL 10 MG
10 SUPPOSITORY, RECTAL RECTAL
Status: DISCONTINUED | OUTPATIENT
Start: 2023-03-21 | End: 2023-03-23

## 2023-03-21 RX ORDER — MIDAZOLAM HYDROCHLORIDE 1 MG/ML
INJECTION INTRAMUSCULAR; INTRAVENOUS
Status: COMPLETED
Start: 2023-03-21 | End: 2023-03-21

## 2023-03-21 RX ORDER — HYDROCODONE BITARTRATE AND ACETAMINOPHEN 5; 325 MG/1; MG/1
1 TABLET ORAL ONCE AS NEEDED
Status: ACTIVE | OUTPATIENT
Start: 2023-03-21 | End: 2023-03-21

## 2023-03-21 RX ORDER — ONDANSETRON 2 MG/ML
INJECTION INTRAMUSCULAR; INTRAVENOUS AS NEEDED
Status: DISCONTINUED | OUTPATIENT
Start: 2023-03-21 | End: 2023-03-21 | Stop reason: SURG

## 2023-03-21 RX ORDER — ONDANSETRON 2 MG/ML
4 INJECTION INTRAMUSCULAR; INTRAVENOUS EVERY 6 HOURS PRN
Status: DISCONTINUED | OUTPATIENT
Start: 2023-03-21 | End: 2023-03-23

## 2023-03-21 RX ORDER — HYDROCODONE BITARTRATE AND ACETAMINOPHEN 5; 325 MG/1; MG/1
2 TABLET ORAL ONCE AS NEEDED
Status: ACTIVE | OUTPATIENT
Start: 2023-03-21 | End: 2023-03-21

## 2023-03-21 RX ORDER — SODIUM CHLORIDE, SODIUM LACTATE, POTASSIUM CHLORIDE, CALCIUM CHLORIDE 600; 310; 30; 20 MG/100ML; MG/100ML; MG/100ML; MG/100ML
INJECTION, SOLUTION INTRAVENOUS CONTINUOUS
Status: DISCONTINUED | OUTPATIENT
Start: 2023-03-21 | End: 2023-03-22 | Stop reason: HOSPADM

## 2023-03-21 RX ORDER — HYDROMORPHONE HYDROCHLORIDE 1 MG/ML
0.6 INJECTION, SOLUTION INTRAMUSCULAR; INTRAVENOUS; SUBCUTANEOUS EVERY 5 MIN PRN
Status: DISCONTINUED | OUTPATIENT
Start: 2023-03-21 | End: 2023-03-22 | Stop reason: HOSPADM

## 2023-03-21 RX ORDER — MEMANTINE HYDROCHLORIDE 5 MG/1
5 TABLET ORAL 2 TIMES DAILY
Status: DISCONTINUED | OUTPATIENT
Start: 2023-03-21 | End: 2023-03-23

## 2023-03-21 RX ORDER — SODIUM CHLORIDE 9 MG/ML
INJECTION, SOLUTION INTRAVENOUS CONTINUOUS
Status: ACTIVE | OUTPATIENT
Start: 2023-03-21 | End: 2023-03-21

## 2023-03-21 RX ORDER — GLIMEPIRIDE 2 MG/1
1 TABLET ORAL 2 TIMES DAILY
Status: DISCONTINUED | OUTPATIENT
Start: 2023-03-21 | End: 2023-03-23

## 2023-03-21 RX ORDER — HYDROMORPHONE HYDROCHLORIDE 1 MG/ML
0.4 INJECTION, SOLUTION INTRAMUSCULAR; INTRAVENOUS; SUBCUTANEOUS EVERY 5 MIN PRN
Status: DISCONTINUED | OUTPATIENT
Start: 2023-03-21 | End: 2023-03-22 | Stop reason: HOSPADM

## 2023-03-21 RX ORDER — HYDROMORPHONE HYDROCHLORIDE 1 MG/ML
0.2 INJECTION, SOLUTION INTRAMUSCULAR; INTRAVENOUS; SUBCUTANEOUS EVERY 5 MIN PRN
Status: DISCONTINUED | OUTPATIENT
Start: 2023-03-21 | End: 2023-03-22 | Stop reason: HOSPADM

## 2023-03-21 RX ORDER — MEMANTINE HYDROCHLORIDE 5 MG/1
5 TABLET ORAL 2 TIMES DAILY
COMMUNITY

## 2023-03-21 RX ORDER — LEVOFLOXACIN 5 MG/ML
750 INJECTION, SOLUTION INTRAVENOUS ONCE
Status: COMPLETED | OUTPATIENT
Start: 2023-03-21 | End: 2023-03-21

## 2023-03-21 RX ADMIN — DEXAMETHASONE SODIUM PHOSPHATE 8 MG: 4 MG/ML VIAL (ML) INJECTION at 21:05:00

## 2023-03-21 RX ADMIN — LIDOCAINE HYDROCHLORIDE 4 ML: 40 SOLUTION TOPICAL at 20:32:00

## 2023-03-21 RX ADMIN — ESMOLOL HYDROCHLORIDE 20 MG: 10 INJECTION INTRAVENOUS at 22:43:00

## 2023-03-21 RX ADMIN — ROCURONIUM BROMIDE 50 MG: 10 INJECTION, SOLUTION INTRAVENOUS at 20:30:00

## 2023-03-21 RX ADMIN — ONDANSETRON 4 MG: 2 INJECTION INTRAMUSCULAR; INTRAVENOUS at 21:05:00

## 2023-03-21 RX ADMIN — SODIUM CHLORIDE, SODIUM LACTATE, POTASSIUM CHLORIDE, CALCIUM CHLORIDE: 600; 310; 30; 20 INJECTION, SOLUTION INTRAVENOUS at 20:23:00

## 2023-03-21 RX ADMIN — LIDOCAINE HYDROCHLORIDE 25 MG: 10 INJECTION, SOLUTION EPIDURAL; INFILTRATION; INTRACAUDAL; PERINEURAL at 20:30:00

## 2023-03-21 NOTE — PLAN OF CARE
NURSING ADMISSION NOTE      Patient admitted via Cart  Oriented to room. Safety precautions initiated. Bed in low position. Call light in reach. A&Ox3, disoriented to day but knew month/year. Room air. Denies pain on admit, but reports pain with moving R knee. Xray ordered. Awaiting ophthalmology to see pt, possible OR for globe rupture. SCDs in place, bed alarm on. NPO.

## 2023-03-21 NOTE — ED INITIAL ASSESSMENT (HPI)
PT from [address] s/p unwitnessed fall. Per ems a/ox2 but baseline unknown. Hx hypertension, pt not taking her meds. Unknown blood thinner use. Pt arrived w/ c collar, no c/o of pain. Hematoma to R eye w/ bruising and swelling.  PT a/ox4 on arrival.

## 2023-03-21 NOTE — ED QUICK NOTES
Orders for admission, patient is aware of plan and ready to go upstairs. Any questions, please call ED RN Jeanette at extension [phone].      Patient Covid vaccination status: Fully vaccinated     COVID Test Ordered in ED: Rapid SARS-CoV-2 by PCR    COVID Suspicion at Admission: N/A    Running Infusions:      Mental Status/LOC at time of transport: a/o x 3    Other pertinent information:   CIWA score: N/A   NIH score:  N/A

## 2023-03-21 NOTE — PROGRESS NOTES
ED Antibiotic Dose Adjustment by Pharmacy    levofloxacin (LEVAQUIN) 500 mg x1 dose has been ordered. Pharmacy has adjusted the dose to levofloxacin (LEVAQUIN) 750 mg x1 per hospital protocol.     Naomie BarkerD  03/21/23, 1:25 PM

## 2023-03-21 NOTE — ED QUICK NOTES
Rounding Completed  Report given to receiving RN. Bed is locked and in lowest position.  Call light within reach, family aware we are awaiting a clean, inpatient bed

## 2023-03-22 ENCOUNTER — APPOINTMENT (OUTPATIENT)
Dept: CV DIAGNOSTICS | Facility: HOSPITAL | Age: 88
End: 2023-03-22
Attending: STUDENT IN AN ORGANIZED HEALTH CARE EDUCATION/TRAINING PROGRAM
Payer: MEDICARE

## 2023-03-22 ENCOUNTER — APPOINTMENT (OUTPATIENT)
Dept: CV DIAGNOSTICS | Facility: HOSPITAL | Age: 88
DRG: 117 | End: 2023-03-22
Attending: STUDENT IN AN ORGANIZED HEALTH CARE EDUCATION/TRAINING PROGRAM
Payer: MEDICARE

## 2023-03-22 LAB
ALBUMIN SERPL-MCNC: 3.1 G/DL (ref 3.4–5)
ALBUMIN/GLOB SERPL: 0.8 {RATIO} (ref 1–2)
ALP LIVER SERPL-CCNC: 77 U/L
ALT SERPL-CCNC: 17 U/L
ANION GAP SERPL CALC-SCNC: 10 MMOL/L (ref 0–18)
AST SERPL-CCNC: 30 U/L (ref 15–37)
BILIRUB SERPL-MCNC: 0.7 MG/DL (ref 0.1–2)
BUN BLD-MCNC: 15 MG/DL (ref 7–18)
CALCIUM BLD-MCNC: 9 MG/DL (ref 8.5–10.1)
CHLORIDE SERPL-SCNC: 105 MMOL/L (ref 98–112)
CO2 SERPL-SCNC: 23 MMOL/L (ref 21–32)
CREAT BLD-MCNC: 0.76 MG/DL
ERYTHROCYTE [DISTWIDTH] IN BLOOD BY AUTOMATED COUNT: 13.6 %
GFR SERPLBLD BASED ON 1.73 SQ M-ARVRAT: 76 ML/MIN/1.73M2 (ref 60–?)
GLOBULIN PLAS-MCNC: 3.7 G/DL (ref 2.8–4.4)
GLUCOSE BLD-MCNC: 152 MG/DL (ref 70–99)
HCT VFR BLD AUTO: 44.4 %
HGB BLD-MCNC: 14.2 G/DL
MAGNESIUM SERPL-MCNC: 1.8 MG/DL (ref 1.6–2.6)
MCH RBC QN AUTO: 30.3 PG (ref 26–34)
MCHC RBC AUTO-ENTMCNC: 32 G/DL (ref 31–37)
MCV RBC AUTO: 94.9 FL
OSMOLALITY SERPL CALC.SUM OF ELEC: 290 MOSM/KG (ref 275–295)
PHOSPHATE SERPL-MCNC: 2.6 MG/DL (ref 2.5–4.9)
PLATELET # BLD AUTO: 199 10(3)UL (ref 150–450)
POTASSIUM SERPL-SCNC: 4 MMOL/L (ref 3.5–5.1)
PROT SERPL-MCNC: 6.8 G/DL (ref 6.4–8.2)
RBC # BLD AUTO: 4.68 X10(6)UL
SODIUM SERPL-SCNC: 138 MMOL/L (ref 136–145)
WBC # BLD AUTO: 9 X10(3) UL (ref 4–11)

## 2023-03-22 PROCEDURE — 99232 SBSQ HOSP IP/OBS MODERATE 35: CPT | Performed by: HOSPITALIST

## 2023-03-22 PROCEDURE — 93306 TTE W/DOPPLER COMPLETE: CPT | Performed by: STUDENT IN AN ORGANIZED HEALTH CARE EDUCATION/TRAINING PROGRAM

## 2023-03-22 RX ORDER — RAMIPRIL 5 MG/1
10 CAPSULE ORAL DAILY
Status: DISCONTINUED | OUTPATIENT
Start: 2023-03-22 | End: 2023-03-23

## 2023-03-22 RX ORDER — PREDNISOLONE ACETATE 10 MG/ML
1 SUSPENSION/ DROPS OPHTHALMIC 4 TIMES DAILY
Status: DISCONTINUED | OUTPATIENT
Start: 2023-03-22 | End: 2023-03-23

## 2023-03-22 RX ORDER — HYDRALAZINE HYDROCHLORIDE 20 MG/ML
10 INJECTION INTRAMUSCULAR; INTRAVENOUS EVERY 6 HOURS PRN
Status: DISCONTINUED | OUTPATIENT
Start: 2023-03-22 | End: 2023-03-23

## 2023-03-22 RX ORDER — CIPROFLOXACIN HYDROCHLORIDE 3.5 MG/ML
1 SOLUTION/ DROPS TOPICAL 4 TIMES DAILY
Status: DISCONTINUED | OUTPATIENT
Start: 2023-03-22 | End: 2023-03-23

## 2023-03-22 NOTE — PLAN OF CARE
A&Ox4. VSS. On 2L via nasal cannula. . IS encouraged. Telemetry monitoring - NSR. SCDs on BLE. Ankle pumps encouraged. Tolerating regular diet. Last BM 3/20. Voiding freely via [address]. Pain controlled. Dressing to right eye, C/D/I. Plan is to work with PT/OT. Patient updated on plan of care. Safety precautions in place. Call light within reach.

## 2023-03-22 NOTE — CM/SW NOTE
Department  notified of request for [facility] referrals started. Assigned CM/SW to follow up with pt/family on further discharge planning.      Maria Esther Hendricks  [facility]

## 2023-03-22 NOTE — PROGRESS NOTES
Patch removed from right eye today. Right eye  Visual Acuity Near   right eye : LP    Motility: Full both eyes    Lids/Lashes : Edema and Ecchymoses.   Post Surgical Conjunctiva covering sutures superiorly  Cornea: Clear  AC: Formed, inferior 2 mm Hyphema  Iris Irregular with superior defect    Impression and Plan    POD 1 S/p Rupture globe repair, right eye  Start Ciprofloxacin QID right eye  Start Pred Forte QID right eye   Eye Shield at all times  Can ambulate, no lifting over 10 lbs    Pain control per primary    Will require follow up with ophthalmology in 1 week for POW1 appointment       Karly Montero MD      03/22/23

## 2023-03-22 NOTE — PLAN OF CARE
Out of OR at 22:45 PM  Can Resume full diet  Pain control Per primary team  Ophthalmology will follow up tomorrow at Bedside tomorrow evening to remove eye patch. Can hold on eye drop medications in the right eye until eye shield is removed.     Will follow up in ophthalmology clinic with Lore Callahan MD in approximately 1 week.    03/21/23

## 2023-03-22 NOTE — OPERATIVE REPORT
OPHTHAMOLOGY OPERATIVE NOTE    Patient Name: Jarocho Delgado    MR#: [id number]    Date of Surgery: 03/21/23    Service: Ophthalmology    Surgeon(s) and Role:     * Meri Damon MD - Primary    Anesthesiologist.: Han Engle MD    Pre-Operative Diagnosis:  Scleral Laceration, Right Eye    Post-Operative Diagnosis:  Scleral Laceration, Right Eye    Procedure:  Procedure(s) (LRB):  RIGHT EYE EXPLORATION/REPAIR RUPTURED GLOBE (Right)    Anesthesia: General  Complications: None  Estimated Blood Loss: Minimal    The patient was brought to the operating room. General anesthesia was induced, and a time out was performed. The eye was cleaned and draped in the usual ophthalmic fashion. The operating microscope was brought to the head of the patient. A Lid Speculum was placed to keep the eyelids open. The globe was noted to be formed with a shallow anterior chamber. A 15 mm curvilinear scleral laceration was noted approximately 1 mm from the limbus extending from the 3 o clock to the 9 o clock position superiorly A Conjunctival peritomy was performed from 1 to 7 o clock. No additional lacerations were noted beyond the linear laceration noted. 8-0 Nylon sutures were then used to close the laceration in a simple interrupted fashion. Several Weck Corbett were used to perform a Weck-Cell Vitrectomy of the incarcerated vitreous in the wound. This was Cut with Vannas scissors. The anterior chamber was then re-formed with BSS and Visco-elastic. Once No more vitreous was noted, 6-0 Vicryl suture was used to re-approximate the conjunctival edges. This was then closed in a simple interrupted fashion. At the conclusion of the case, 1.0 mL of 100 mg/mL Ancef and 1.0 mL of 10 mg/mL Gentamicin an 0.3 mL of 40mg/mL Kenalog was injected into the subconjunctival space. Lubricating ointment was then placed in the operative eye. An eye pad was placed and taped over the eye, followed by a clear shield.  The patient was brought to the recovery room in stable condition.

## 2023-03-22 NOTE — PLAN OF CARE
Received from PACU around 0100. Patient A & O x3, drowsy, arousable. Denies any pain. SBP running in 170s, MD notified, order for PRN hydralazine given. Voiding freely. Purewick in place. Right eye covered with eye patch and tape, eye shield on top. Safety measures in place. Instructed to use call light.

## 2023-03-22 NOTE — ANESTHESIA POSTPROCEDURE EVALUATION
[facility] Patient Status:  Inpatient   Age/Gender 80year old female MRN [id number]   Location [facility] Attending Tonia Dumont MD   [facility] Day # 0 [facility] SMITH, [address]       Anesthesia Post-op Note    RIGHT EYE EXPLORATION/REPAIR RUPTURED GLOBE    Procedure Summary     Date: 03/21/23 Room / Location: [facility] MAIN OR 04 / [facility] OR    Anesthesia Start: 2023 Anesthesia Stop: 2258    Procedure: RIGHT EYE EXPLORATION/REPAIR RUPTURED GLOBE (Right: Eye) Diagnosis:       Eye rupture with partial loss of intraocular tissue      (Eye rupture with partial loss of intraocular tissue [S05.20XA])    Surgeons: Meri Damon MD Anesthesiologist: Han Engle MD    Anesthesia Type: general ASA Status: 3          Anesthesia Type: general    Vitals Value Taken Time   /84 03/21/23 2255   Temp 97.2 03/21/23 2258   Pulse 86 03/21/23 2257   Resp 29 03/21/23 2257   SpO2 97 % 03/21/23 2257   Vitals shown include unvalidated device data. Patient Location: PACU    Anesthesia Type: general    Airway Patency: patent    Postop Pain Control: adequate    Mental Status: mildly sedated but able to meaningfully participate in the post-anesthesia evaluation    Nausea/Vomiting: none    Cardiopulmonary/Hydration status: stable euvolemic    Complications: no apparent anesthesia related complications    Postop vital signs: stable    Dental Exam: Unchanged from Preop    Patient to be discharged from PACU when criteria met.

## 2023-03-22 NOTE — PROGRESS NOTES
03/22/23 1043   Clinical Encounter Type   Visited With Patient; Family   Routine Visit Introduction   Taxonomy   Intended Effects Aligning care plan with patient's values   Methods Offer support   Interventions Acknowledge current situation; Active listening; Ask guided questions      checked in with patient concerning consult for Nicho Valdez at bedside. Patient appeared drowsy.  talked with Tamara Schmitt about options to connect patient with her ba while in hospital. Local [facility] is Michael Lynch in [address] [place] [address] Ernesto asked that she be put on prayer list. Heather Mcnamara will be made aware of this patient for potential follow up visit. Darrin advised Dr. Silke Tovar be visiting and not sure if patient will be discharged. Spiritual Care support can be requested via an [employer] consult. JUANITA Delgado Div  Extension:[phone]

## 2023-03-22 NOTE — CM/SW NOTE
03/22/23 1600   CM/SW Referral Data   Referral Source Social Work (self-referral)   Reason for Referral Discharge planning   Informant EMR;Clinical Staff Member   Discharge Needs   Anticipated D/C needs Subacute rehab;Transportation services       HOME SITUATION  Type of Home: Independent living facility   Home Layout: One level  Stairs to Enter : 0  Stairs to [employer]: 0     Lives With: Staff 24 hours  Drives: No  Patient Owned Equipment: Rolling walker     Prior Level of [place] per PT eval: per pt, she was ind with all ADL and functional mobility PTA. Patient is an 81 y/o woman admitted s/p fall with eye injury. PT recommending [facility]. Request sent to [facility] for [facility] referrals in [address]. PASRR to be completed. Await responses for further DC planning. / to remain available for support and/or discharge planning.      Donald Simmons LCSW  Discharge Planner  [phone]

## 2023-03-22 NOTE — ANESTHESIA PROCEDURE NOTES
Airway  Date/Time: 3/21/2023 8:32 PM  Urgency: elective      General Information and Staff    Patient location during procedure: OR  Anesthesiologist: Tori Jean-Baptiste MD  Performed: anesthesiologist   Performed by: Tori Jean-Baptiste MD  Authorized by:  Tori Jean-Baptiste MD      Indications and Patient Condition  Indications for airway management: anesthesia  Sedation level: deep  Preoxygenated: yes  Patient position: sniffing  Mask difficulty assessment: 1 - vent by mask    Final Airway Details  Final airway type: endotracheal airway      Successful airway: ETT  Cuffed: yes   Successful intubation technique: Video laryngoscopy  Facilitating devices/methods: intubating stylet  Endotracheal tube insertion site: oral  Blade: GlideScope  Blade size: #3  ETT size (mm): 7.0    Cormack-Lehane Classification: grade I - full view of glottis  Placement verified by: chest auscultation and capnometry   Measured from: lips  ETT to lips (cm): 21  Number of attempts at approach: 1    Additional Comments  4 mL 4% lido LTA

## 2023-03-23 VITALS
WEIGHT: 155 LBS | DIASTOLIC BLOOD PRESSURE: 73 MMHG | SYSTOLIC BLOOD PRESSURE: 134 MMHG | HEART RATE: 87 BPM | BODY MASS INDEX: 28 KG/M2 | RESPIRATION RATE: 16 BRPM | OXYGEN SATURATION: 95 % | TEMPERATURE: 98 F

## 2023-03-23 LAB — SARS-COV-2 RNA RESP QL NAA+PROBE: NOT DETECTED

## 2023-03-23 PROCEDURE — 99239 HOSP IP/OBS DSCHRG MGMT >30: CPT | Performed by: HOSPITALIST

## 2023-03-23 RX ORDER — CIPROFLOXACIN HYDROCHLORIDE 3.5 MG/ML
1 SOLUTION/ DROPS TOPICAL 4 TIMES DAILY
Qty: 1 EACH | Refills: 0 | Status: SHIPPED | OUTPATIENT
Start: 2023-03-23

## 2023-03-23 RX ORDER — PREDNISOLONE ACETATE 10 MG/ML
1 SUSPENSION/ DROPS OPHTHALMIC 4 TIMES DAILY
Qty: 1 EACH | Refills: 0 | Status: SHIPPED | OUTPATIENT
Start: 2023-03-23

## 2023-03-23 RX ORDER — AMLODIPINE BESYLATE 5 MG/1
5 TABLET ORAL DAILY
Status: DISCONTINUED | OUTPATIENT
Start: 2023-03-23 | End: 2023-03-23

## 2023-03-23 RX ORDER — PREDNISOLONE ACETATE 10 MG/ML
1 SUSPENSION/ DROPS OPHTHALMIC 4 TIMES DAILY
Qty: 1 EACH | Refills: 0 | Status: SHIPPED | OUTPATIENT
Start: 2023-03-23 | End: 2023-03-23

## 2023-03-23 RX ORDER — ERYTHROMYCIN 5 MG/G
1 OINTMENT OPHTHALMIC NIGHTLY
Qty: 3.5 G | Refills: 0 | Status: SHIPPED | OUTPATIENT
Start: 2023-03-23

## 2023-03-23 RX ORDER — HYDRALAZINE HYDROCHLORIDE 20 MG/ML
10 INJECTION INTRAMUSCULAR; INTRAVENOUS EVERY 4 HOURS PRN
Status: DISCONTINUED | OUTPATIENT
Start: 2023-03-23 | End: 2023-03-23

## 2023-03-23 RX ORDER — AMLODIPINE BESYLATE 5 MG/1
5 TABLET ORAL DAILY
Refills: 0 | Status: SHIPPED | COMMUNITY
Start: 2023-03-24

## 2023-03-23 NOTE — PLAN OF CARE
IV removed. Patient discharged home with all personal belongings. Patient voiced understanding of all discharge medications follow up appointments, signs and symptoms to look out for. Patient discharged via wheelchair with staff as patient family member (son) going to take patient to [facility]

## 2023-03-23 NOTE — PLAN OF CARE
Patient clear for hospital discharge from ophthalmology stand-point    Recommend the following for the right eye    Ciprofloxacin drops, 1 drop, 4 times a day  Prednisolone Acetate drops, 1 drop, 4 times a day  Erythromycin Ointment, 1 application, at night before bed    Wear eye shield over eye with tape to keep in place at all times  No lifting over 10 lbs for the first week    Follow up with Ophthalmology office in 1 week. Call [phone] to schedule with Kurtis Ocasio.       Kurtis Ocasio MD      03/23/23

## 2023-03-23 NOTE — PLAN OF CARE
Patient A & O x3, forgetful at times. Denies any pain. SBP elevated, PRN hydralazine given with good relief. Voiding freely. Purewick in place. Right eye covered with eye shield on top. Safety measures in place. Instructed to use call light.

## 2023-03-23 NOTE — DISCHARGE INSTRUCTIONS
Recommend the following for the right eye     Ciprofloxacin drops, 1 drop, 4 times a day  Prednisolone Acetate drops, 1 drop, 4 times a day  Erythromycin Ointment, 1 application, at night before bed     Wear eye shield over eye with tape to keep in place at all times  No lifting over 10 lbs for the first week     Follow up with Ophthalmology office in 1 week. Call [phone] to schedule with Oracio Hoyt.

## 2023-03-23 NOTE — CM/SW NOTE
03/23/23 1411   Choice of Post-Acute Provider   Informed patient of right to choose their preferred provider Yes   List of appropriate post-acute services provided to patient/family with quality data Yes   Patient/family choice [facility]   Information given to Patient     Met with pt and provided list of accepting [facility] facilities. Pt agreeable with planning for [facility] and [facility]. Discussed plan for DC today. Reviewed transportation options and costs for [employer]. Pt agreeable with costs not covered by insurance. Spoke with Clifton Curtis from [place] who confirmed pt can be accepted. She requested 5pm discharge and that pt has rapid COVID testing prior to DC. [facility] transport scheduled for 5pm.  PCS form completed and available for RN to print. Attempted to reach pt's son Mukund Unger - message left. Updated pt's RN. / to remain available for support and/or discharge planning. [facility]  [phone]    [facility]  [phone]    Manuel Nixon LCSW  Discharge Planner  [phone]    Addendum:  Received call from pt's son who is agreeable with DC plan for today. He would like to provide transport and will speak with pt about this when he arrives today. Updated RN. Will cancel [facility] if pt agreeable with son providing transport. Addendum: Son to provide transport. [facility] canceled. Updated RN.

## 2023-12-04 ENCOUNTER — APPOINTMENT (OUTPATIENT)
Dept: GENERAL RADIOLOGY | Facility: HOSPITAL | Age: 88
End: 2023-12-04
Attending: EMERGENCY MEDICINE
Payer: MEDICARE

## 2023-12-04 ENCOUNTER — APPOINTMENT (OUTPATIENT)
Dept: CT IMAGING | Facility: HOSPITAL | Age: 88
End: 2023-12-04
Attending: EMERGENCY MEDICINE
Payer: MEDICARE

## 2023-12-04 ENCOUNTER — HOSPITAL ENCOUNTER (EMERGENCY)
Facility: HOSPITAL | Age: 88
Discharge: HOME | End: 2023-12-04
Attending: EMERGENCY MEDICINE
Payer: MEDICARE

## 2023-12-04 VITALS
SYSTOLIC BLOOD PRESSURE: 128 MMHG | DIASTOLIC BLOOD PRESSURE: 76 MMHG | HEART RATE: 77 BPM | BODY MASS INDEX: 28 KG/M2 | TEMPERATURE: 98 F | RESPIRATION RATE: 15 BRPM | OXYGEN SATURATION: 99 % | WEIGHT: 154.31 LBS

## 2023-12-04 DIAGNOSIS — S16.1XXA STRAIN OF NECK MUSCLE, INITIAL ENCOUNTER: ICD-10-CM

## 2023-12-04 DIAGNOSIS — S00.93XA CONTUSION OF HEAD, UNSPECIFIED PART OF HEAD, INITIAL ENCOUNTER: Primary | ICD-10-CM

## 2023-12-04 PROCEDURE — 70450 CT HEAD/BRAIN W/O DYE: CPT | Performed by: EMERGENCY MEDICINE

## 2023-12-04 PROCEDURE — 99284 EMERGENCY DEPT VISIT MOD MDM: CPT

## 2023-12-04 PROCEDURE — 72125 CT NECK SPINE W/O DYE: CPT | Performed by: EMERGENCY MEDICINE

## 2023-12-05 NOTE — DISCHARGE INSTRUCTIONS
Follow-up for further evaluation primary physician. Return if new or worse symptoms. Ice to areas of pain. Tylenol or ibuprofen  as needed.

## 2023-12-05 NOTE — ED QUICK NOTES
C collar removed by ermd. Pt able to move all four extremities without difficulty. Pt able to ambulate with one assist with a walker. Tolerated well and ermd updated.

## 2024-12-14 ENCOUNTER — HOSPITAL ENCOUNTER (INPATIENT)
Facility: HOSPITAL | Age: 89
LOS: 1 days | Discharge: HOME HEALTH | End: 2024-12-17
Attending: EMERGENCY MEDICINE | Admitting: HOSPITALIST
Payer: MEDICARE

## 2024-12-14 DIAGNOSIS — L03.116 BILATERAL LOWER LEG CELLULITIS: Primary | ICD-10-CM

## 2024-12-14 DIAGNOSIS — L03.115 BILATERAL LOWER LEG CELLULITIS: Primary | ICD-10-CM

## 2024-12-14 LAB
ALBUMIN SERPL-MCNC: 4 G/DL (ref 3.2–4.8)
ALBUMIN/GLOB SERPL: 1.4 {RATIO} (ref 1–2)
ALP LIVER SERPL-CCNC: 97 U/L
ALT SERPL-CCNC: 12 U/L
ANION GAP SERPL CALC-SCNC: 7 MMOL/L (ref 0–18)
AST SERPL-CCNC: 20 U/L (ref ?–34)
BASOPHILS # BLD AUTO: 0.04 X10(3) UL (ref 0–0.2)
BASOPHILS NFR BLD AUTO: 0.5 %
BILIRUB SERPL-MCNC: 0.8 MG/DL (ref 0.2–1.1)
BILIRUB UR QL STRIP.AUTO: NEGATIVE
BUN BLD-MCNC: 18 MG/DL (ref 9–23)
CALCIUM BLD-MCNC: 9.4 MG/DL (ref 8.7–10.4)
CHLORIDE SERPL-SCNC: 100 MMOL/L (ref 98–112)
CO2 SERPL-SCNC: 31 MMOL/L (ref 21–32)
COLOR UR AUTO: YELLOW
CREAT BLD-MCNC: 1.3 MG/DL
EGFRCR SERPLBLD CKD-EPI 2021: 39 ML/MIN/1.73M2 (ref 60–?)
EOSINOPHIL # BLD AUTO: 0.31 X10(3) UL (ref 0–0.7)
EOSINOPHIL NFR BLD AUTO: 3.8 %
ERYTHROCYTE [DISTWIDTH] IN BLOOD BY AUTOMATED COUNT: 14 %
GLOBULIN PLAS-MCNC: 2.9 G/DL (ref 2–3.5)
GLUCOSE BLD-MCNC: 126 MG/DL (ref 70–99)
GLUCOSE UR STRIP.AUTO-MCNC: NORMAL MG/DL
HCT VFR BLD AUTO: 34.4 %
HGB BLD-MCNC: 11.1 G/DL
HYALINE CASTS #/AREA URNS AUTO: PRESENT /LPF
IMM GRANULOCYTES # BLD AUTO: 0.03 X10(3) UL (ref 0–1)
IMM GRANULOCYTES NFR BLD: 0.4 %
KETONES UR STRIP.AUTO-MCNC: NEGATIVE MG/DL
LACTATE SERPL-SCNC: 1.2 MMOL/L (ref 0.5–2)
LEUKOCYTE ESTERASE UR QL STRIP.AUTO: 250
LYMPHOCYTES # BLD AUTO: 0.69 X10(3) UL (ref 1–4)
LYMPHOCYTES NFR BLD AUTO: 8.5 %
MCH RBC QN AUTO: 30 PG (ref 26–34)
MCHC RBC AUTO-ENTMCNC: 32.3 G/DL (ref 31–37)
MCV RBC AUTO: 93 FL
MONOCYTES # BLD AUTO: 0.65 X10(3) UL (ref 0.1–1)
MONOCYTES NFR BLD AUTO: 8 %
NEUTROPHILS # BLD AUTO: 6.39 X10 (3) UL (ref 1.5–7.7)
NEUTROPHILS # BLD AUTO: 6.39 X10(3) UL (ref 1.5–7.7)
NEUTROPHILS NFR BLD AUTO: 78.8 %
NITRITE UR QL STRIP.AUTO: NEGATIVE
OSMOLALITY SERPL CALC.SUM OF ELEC: 289 MOSM/KG (ref 275–295)
PH UR STRIP.AUTO: 6.5 [PH] (ref 5–8)
PLATELET # BLD AUTO: 223 10(3)UL (ref 150–450)
POTASSIUM SERPL-SCNC: 3.8 MMOL/L (ref 3.5–5.1)
PROT SERPL-MCNC: 6.9 G/DL (ref 5.7–8.2)
PROT UR STRIP.AUTO-MCNC: 20 MG/DL
RBC # BLD AUTO: 3.7 X10(6)UL
RBC #/AREA URNS AUTO: >10 /HPF
SODIUM SERPL-SCNC: 138 MMOL/L (ref 136–145)
SP GR UR STRIP.AUTO: 1.01 (ref 1–1.03)
UROBILINOGEN UR STRIP.AUTO-MCNC: NORMAL MG/DL
WBC # BLD AUTO: 8.1 X10(3) UL (ref 4–11)

## 2024-12-14 PROCEDURE — 99223 1ST HOSP IP/OBS HIGH 75: CPT | Performed by: HOSPITALIST

## 2024-12-14 RX ORDER — CIPROFLOXACIN HYDROCHLORIDE 3.5 MG/ML
1 SOLUTION/ DROPS TOPICAL 4 TIMES DAILY
Status: DISCONTINUED | OUTPATIENT
Start: 2024-12-14 | End: 2024-12-15

## 2024-12-14 RX ORDER — PREDNISOLONE ACETATE 10 MG/ML
1 SUSPENSION/ DROPS OPHTHALMIC 4 TIMES DAILY
Status: DISCONTINUED | OUTPATIENT
Start: 2024-12-14 | End: 2024-12-15

## 2024-12-14 RX ORDER — ATORVASTATIN CALCIUM 10 MG/1
10 TABLET, FILM COATED ORAL NIGHTLY
Status: DISCONTINUED | OUTPATIENT
Start: 2024-12-15 | End: 2024-12-15

## 2024-12-14 RX ORDER — AMLODIPINE BESYLATE 5 MG/1
5 TABLET ORAL DAILY
Status: DISCONTINUED | OUTPATIENT
Start: 2024-12-15 | End: 2024-12-15

## 2024-12-14 RX ORDER — RAMIPRIL 5 MG/1
10 CAPSULE ORAL DAILY
Status: DISCONTINUED | OUTPATIENT
Start: 2024-12-14 | End: 2024-12-15

## 2024-12-14 RX ORDER — ERYTHROMYCIN 5 MG/G
1 OINTMENT OPHTHALMIC NIGHTLY
Status: DISCONTINUED | OUTPATIENT
Start: 2024-12-14 | End: 2024-12-15

## 2024-12-14 RX ORDER — METOPROLOL SUCCINATE 50 MG/1
50 TABLET, EXTENDED RELEASE ORAL
Status: DISCONTINUED | OUTPATIENT
Start: 2024-12-15 | End: 2024-12-17

## 2024-12-14 RX ORDER — MEMANTINE HYDROCHLORIDE 5 MG/1
5 TABLET ORAL 2 TIMES DAILY
Status: DISCONTINUED | OUTPATIENT
Start: 2024-12-15 | End: 2024-12-17

## 2024-12-14 RX ORDER — MULTIVITAMIN/IRON/FOLIC ACID 18MG-0.4MG
250 TABLET ORAL 4 TIMES DAILY
Status: DISCONTINUED | OUTPATIENT
Start: 2024-12-14 | End: 2024-12-15

## 2024-12-15 PROCEDURE — 99233 SBSQ HOSP IP/OBS HIGH 50: CPT | Performed by: HOSPITALIST

## 2024-12-15 RX ORDER — NYSTATIN 100000 U/G
1 CREAM TOPICAL EVERY 8 HOURS PRN
COMMUNITY
End: 2024-12-17

## 2024-12-15 RX ORDER — FUROSEMIDE 10 MG/ML
40 INJECTION INTRAMUSCULAR; INTRAVENOUS ONCE
Status: COMPLETED | OUTPATIENT
Start: 2024-12-15 | End: 2024-12-15

## 2024-12-15 RX ORDER — FUROSEMIDE 40 MG/1
40 TABLET ORAL DAILY
COMMUNITY

## 2024-12-15 RX ORDER — NITROFURANTOIN 25; 75 MG/1; MG/1
100 CAPSULE ORAL 2 TIMES DAILY
COMMUNITY
End: 2024-12-17

## 2024-12-15 RX ORDER — LOSARTAN POTASSIUM 25 MG/1
25 TABLET ORAL DAILY
COMMUNITY
End: 2024-12-17

## 2024-12-15 RX ORDER — ACETAMINOPHEN 325 MG/1
650 TABLET ORAL EVERY 4 HOURS PRN
COMMUNITY

## 2024-12-15 NOTE — ED PROVIDER NOTES
Patient Seen in: [facility] Emergency Department      History     Chief Complaint   Patient presents with    Cellulitis     Stated Complaint: BLE cellulitis    Subjective:   HPI      89-year-old female was brought to the emergency room for evaluation of her bilateral erythematous legs.  The patient has significant dementia and the history is obtained through the nursing home information.  She has a very poor historian.  She does complain of having pain when asked about her legs.  She denying any fevers or chills.  Has no chest pain or troubles with.  She has any abdominal pains.  No vomiting or diarrhea.  She is no other complaints at this time.    Objective:     Past Medical History:    Arthritis    Cataract    Epiretinal membrane (ERM) of both eyes    Essential hypertension    Hyperlipidemia    Macular degeneration    PCO (posterior capsular opacification), bilateral    Posterior vitreous detachment    Presbyopia    Pseudophakia of both eyes    S/P YAG capsulotomy              Past Surgical History:   Procedure Laterality Date    Back surgery      Cataract      Cataract extraction w/ intraocular lens  implant, bilateral      Hysterectomy      Tonsillectomy      Yag capsulotomy - od - right eye Right Dr. Coats 10/18/2016    Yag capsulotomy - os - left eye                  Social History     Socioeconomic History    Marital status:    Tobacco Use    Smoking status: Never    Smokeless tobacco: Never   Substance and Sexual Activity    Alcohol use: Not Currently    Drug use: Never                  Physical Exam     ED Triage Vitals [12/14/24 2017]   /53   Pulse 68   Resp 17   Temp 97 °F (36.1 °C)   Temp src Temporal   SpO2 100 %   O2 Device None (Room air)       Current Vitals:   Vital Signs  BP: 96/75  Pulse: 71  Resp: 15  Temp: 97 °F (36.1 °C)  Temp src: Temporal  MAP (mmHg): 82    Oxygen Therapy  SpO2: 100 %  O2 Device: None (Room air)        Physical Exam  HEENT; NCAT, EOMI, throat clear, neck  supple, no LAD, no JVD  Heart S1S2 RRR  lungs: CTAB  abd: Soft NT, ND,  NABS without rebound or guarding  Ext bilateral lower leg erythema that are tender and hot    ED Course     Labs Reviewed   COMP METABOLIC PANEL (14) - Abnormal; Notable for the following components:       Result Value    Glucose 126 (*)     Creatinine 1.30 (*)     eGFR-Cr 39 (*)     All other components within normal limits   CBC WITH DIFFERENTIAL WITH PLATELET - Abnormal; Notable for the following components:    RBC 3.70 (*)     HGB 11.1 (*)     HCT 34.4 (*)     Lymphocyte Absolute 0.69 (*)     All other components within normal limits   LACTIC ACID, PLASMA - Normal   URINALYSIS, ROUTINE   BLOOD CULTURE   BLOOD CULTURE       ED Course as of 12/14/24 2114  ------------------------------------------------------------  Time: 12/14 2113  Comment: The patient had blood cultures x 2 sent.  Her electrolytes and creatinine 1.3.  White count was 8.1 thousand.  Lactic acid level is 1.2.  The patient was given IV Rocephin and will need to be admitted to the hospital for further management.       Medications   cefTRIAXone (Rocephin) 2 g in sodium chloride 0.9% 100 mL IVPB-ADDV (0 g Intravenous Stopped 12/14/24 2109)            MDM      Differential diagnosis included allergic reaction, cellulitis but not limited such.  The patient does have bilateral cellulitis and at this time the patient admitted for further management.      This note was prepared using Dragon Medical voice recognition dictation software.  As a result errors may occur.  When identified to these areas have been corrected.  While every attempt is made to correct errors during dictation discrepancies may still exist.  Please contact if there are any errors.        Medical Decision Making      Disposition and Plan     Clinical Impression:  1. Bilateral lower leg cellulitis         Disposition:  There is no disposition on file for this visit.  There is no disposition time on file for this  visit.    Follow-up:  No follow-up provider specified.        Medications Prescribed:  Current Discharge Medication List              Supplementary Documentation:

## 2024-12-15 NOTE — PROGRESS NOTES
[facility]   part of [facility]     Hospitalist Progress Note     Ophelia Parker Patient Status:  Observation    1935 MRN [id number]   Location [facility] 3NW-A Attending Gilma Costa MD   Hosp Day # 0 PCP Mohinder Perera MD     Chief Complaint:   Chief Complaint   Patient presents with    Cellulitis       Subjective:     Patient denies any complaints.     Objective:    Review of Systems:   6  point ROS completed and was negative, except for pertinent positive and negatives stated in subjective.    Vital signs:  Temp:  [97 °F (36.1 °C)-98.3 °F (36.8 °C)] 98.3 °F (36.8 °C)  Pulse:  [68-80] 74  Resp:  [15-19] 18  BP: ()/(44-75) 121/47  SpO2:  [99 %-100 %] 99 %    Physical Exam:    General: No acute distress.   Respiratory: Clear to auscultation bilaterally. No wheezes. No rhonchi.  Cardiovascular: S1, S2. Regular rate and rhythm. No murmurs.  Abdomen: Soft, nontender, nondistended.    Extremities: bilateral edema with redness and tenderness and warmth    Diagnostic Data:    Labs:  Recent Labs   Lab 24   WBC 8.1   HGB 11.1*   MCV 93.0   .0       Recent Labs   Lab 24   *   BUN 18   CREATSERUM 1.30*   CA 9.4   ALB 4.0      K 3.8      CO2 31.0   ALKPHO 97   AST 20   ALT 12   BILT 0.8   TP 6.9       Estimated Creatinine Clearance: 21.1 mL/min (A) (based on SCr of 1.3 mg/dL (H)).    No results for input(s): \"PTP\", \"INR\" in the last 168 hours.         COVID-19 Lab Results    COVID-19  Lab Results   Component Value Date    COVID19 Not Detected 2023    COVID19 Not Detected 2023    COVID19 Not Detected 2022       Pro-Calcitonin  No results for input(s): \"PCT\" in the last 168 hours.    Cardiac  No results for input(s): \"TROP\", \"PBNP\" in the last 168 hours.    Creatinine Kinase  No results for input(s): \"CK\" in the last 168 hours.    Inflammatory Markers  No results for input(s): \"CRP\", \"KI\", \"LDH\", \"DDIMER\" in the last 168  hours.    No results for input(s): \"TROP\", \"TROPHS\", \"CK\" in the last 168 hours.    Imaging: Imaging data reviewed in Epic.    Medications:    amLODIPine  5 mg Oral Daily    atorvastatin  10 mg Oral Nightly    memantine  5 mg Oral BID    metoprolol succinate ER  50 mg Oral Daily Beta Blocker    ceFAZolin  2 g Intravenous Q8H       Assessment & Plan:        # stasis dermatitis vs bilateral cellulitis; IV lasix; empiric abx; hold CCB  # recent history of right scleral laceration s/p right eye exploration/repair ruptured globe  # HTN   # HLD   # Dementia - mainly short - term memory issues at recent baseline - memantine  # Anemia -monitor  # LIANG -monitor response to diuresis; check renal doppler; hold ARB and oral  lasix for now  # edema; could be from CCB but will trial lasix to see if helps; need to monitor Cr closely    Plan of care discussed with patient and RN    Sage Roman MD    Supplementary Documentation:     Quality:  DVT Prophylaxis: scds for now  CODE status: see chart  Tyler: no  Central line: no      Estimated date of discharge: 2-3 days?  At this point Ms. Parker is expected to be discharge to: d

## 2024-12-15 NOTE — PROGRESS NOTES
A&Ox2-3. VSS. RA. .  GI: Abdomen soft, nondistended. Passing gas.  Denies nausea.  : Voids via purewick- bladder scans q8  Pain controlled with PRN pain medications  Up with assistance and a walker   Skin: BLE warm and red, coccyx has mild redness- both KAMERON   Diet: Tolerating regular diet   Saline locked, IV abx per order   All appropriate safety measures in place. All questions and concerns addressed.

## 2024-12-15 NOTE — PROGRESS NOTES
Patient admitted via Cart.  Oriented to room.  Safety precautions initiated.  Bed in low position, bed alarm initiated  Call light in reach.    Skin check performed with JANELLE Sexton. Pt has redness to BLE, and coccyx, and a broken left 4th toenail.

## 2024-12-15 NOTE — H&P
[facility]     Ophelia Parker Patient Status:  Observation    1935 MRN [id number]   Location [facility] 3NW-A Attending Gilma Costa MD   Hosp Day # 0 PCP Mohinder Perera MD     Chief Complaint: Lower extremities edema and redness both feet of to the knees level no open wound    Subjective:    History of Present Illness:     Ophelia Parker is a 89 year old female with moderate dementia blindness right eye hyperlipidemia who presents to the hospital to be evaluated for lower extremities edema and redness.  Patient is a poor historian most of the details are obtained from the chart.  Patient lives in a nursing home and according to nursing home staff she had no fever or chills vomiting diarrhea chest pain shortness of breath.  There is no history of trauma.  Patient has history of scleral laceration following up with ophthalmology.    History/Other:    Past Medical History:  Past Medical History:    Arthritis    Cataract    Epiretinal membrane (ERM) of both eyes    Essential hypertension    Hyperlipidemia    Macular degeneration    PCO (posterior capsular opacification), bilateral    Posterior vitreous detachment    Presbyopia    Pseudophakia of both eyes    S/P YAG capsulotomy     Past Surgical History:   Past Surgical History:   Procedure Laterality Date    Back surgery      Cataract      Cataract extraction w/ intraocular lens  implant, bilateral      Hysterectomy      Tonsillectomy      Yag capsulotomy - od - right eye Right Dr. Coats 10/18/2016    Yag capsulotomy - os - left eye        Family History:   History reviewed. No pertinent family history.  Social History:    reports that she has never smoked. She has never used smokeless tobacco. She reports that she does not currently use alcohol. She reports that she does not use drugs.     Allergies: Allergies[1]    Medications:  Medications Ordered Prior to Encounter[2]    Review of Systems:   A comprehensive review  of systems was completed.    Pertinent positives and negatives noted in the HPI.    Objective:   Physical Exam:    /44 (BP Location: Right arm)   Pulse 80   Temp 98 °F (36.7 °C) (Oral)   Resp 18   Wt 205 lb (93 kg)   SpO2 100%   BMI 37.49 kg/m²   General: No acute distress, Alert  Respiratory: No rhonchi, no wheezes  Cardiovascular: S1, S2. Regular rate and rhythm  Abdomen: Soft, Non-tender, non-distended, positive bowel sounds  Neuro: No new focal deficits  Extremities: LEs edema and redness      Results:    Labs:      Labs Last 24 Hours:    Recent Labs   Lab 12/14/24 2023   RBC 3.70*   HGB 11.1*   HCT 34.4*   MCV 93.0   MCH 30.0   MCHC 32.3   RDW 14.0   NEPRELIM 6.39   WBC 8.1   .0       Recent Labs   Lab 12/14/24 2023   *   BUN 18   CREATSERUM 1.30*   EGFRCR 39*   CA 9.4   ALB 4.0      K 3.8      CO2 31.0   ALKPHO 97   AST 20   ALT 12   BILT 0.8   TP 6.9       Lab Results   Component Value Date    INR 1.05 01/27/2022       No results for input(s): \"TROP\", \"TROPHS\", \"CK\" in the last 168 hours.    No results for input(s): \"TROP\", \"PBNP\" in the last 168 hours.    No results for input(s): \"PCT\" in the last 168 hours.    Imaging: Imaging data reviewed in Epic.    Assessment & Plan:      # 89 years old female with a recent history of right scleral laceration s/p right eye exploration/repair ruptured globe    # Lower extremity significant redness due to cellulitis after the knees level  -Continue Ancef 2 g IV every 8 hours  # HTN stable  # HLD -   # Dementia - mainly short - term memory issues at recent baseline - memantine  # Anemia hemoglobin 11.1  # LIANG creatinine 1.3 continue gentle IV hydration        Plan of care discussed with emergency room physician    Gilma Costa MD    Supplementary Documentation:     The 21st Century Cures Act makes medical notes like these available to patients in the interest of transparency. Please be advised this is a medical document. Medical  documents are intended to carry relevant information, facts as evident, and the clinical opinion of the practitioner. The medical note is intended as peer to peer communication and may appear blunt or direct. It is written in medical language and may contain abbreviations or verbiage that are unfamiliar.                                       [1]   Allergies  Allergen Reactions    Tegaderm Ag Mesh 2\"X2\" [Dome-Paste Bandage] RASH   [2]   No current facility-administered medications on file prior to encounter.     Current Outpatient Medications on File Prior to Encounter   Medication Sig Dispense Refill    amLODIPine 5 MG Oral Tab Take 1 tablet (5 mg total) by mouth daily.  0    ciprofloxacin 0.3 % Ophthalmic Solution Place 1 drop into the right eye in the morning, at noon, in the evening, and at bedtime. 1 each 0    erythromycin 5 MG/GM Ophthalmic Ointment Place 1 Application into the right eye nightly. 3.5 g 0    prednisoLONE 1 % Ophthalmic Suspension Place 1 drop into the right eye in the morning, at noon, in the evening, and at bedtime. 1 each 0    memantine 5 MG Oral Tab Take 1 tablet (5 mg total) by mouth 2 (two) times daily.      CALCIUM-VITAMIN D OR       alendronate 70 MG Oral Tab TAKE 1 TABLET BY MOUTH ONCE A WEEK. TAKE IN THE MORNING WITH A GLASS OF WATER ON AN EMPTY STOMACH. NOTHING BY MOUTH OR LIE DOWN FOR 30 MINS      triamcinolone acetonide 0.1 % External Cream APPLY ONE APPLICATION EXTERNALLY TO AFFECTED AREA TWICE A DAY      ramipril 10 MG Oral Cap Take 1 capsule (10 mg total) by mouth daily.      Atorvastatin Calcium 10 MG Oral Tab Take 1 tablet (10 mg total) by mouth nightly.      Metoprolol Succinate ER 50 MG Oral Tablet 24 Hr Take 1 tablet (50 mg total) by mouth daily.      Multiple Vitamin Oral Tab Take 1 tablet by mouth daily.      magnesium 250 MG Oral Tab Take 1 tablet (250 mg total) by mouth.        ischemic ,     TTE 1/9 showing EF 25%    c/w HD   today    continue amio 200 mg po qd    lop25 q12

## 2024-12-15 NOTE — ED INITIAL ASSESSMENT (HPI)
BLE redness/swelling, pt currently being treated with macrobid for UTI, hx dementia, pt is unpleasant and a poor historian

## 2024-12-15 NOTE — ED QUICK NOTES
Orders for admission, patient is aware of plan and ready to go upstairs. Any questions, please call ED RN Kae CALDWELL at extension [phone].     Patient Covid vaccination status: Fully vaccinated     COVID Test Ordered in ED: None    COVID Suspicion at Admission: N/A    Running Infusions:  None    Mental Status/LOC at time of transport: A&Ox1-2    Other pertinent information:   CIWA score: N/A   NIH score:  N/A

## 2024-12-15 NOTE — PLAN OF CARE
Neuro: A&Ox3. VSS. RA. . Denies chest pain and SOB.   GI: Abdomen soft, denies belching, denies N/V, pt reports passing gas.  : Pt voids freely with adequate output  Pain Pt denies pain at the moment.   Amb: U with 1 assist and a walker  Diet: Regular diet  IV abx given per MAR    All appropriate safety measures in place. Pt updated wit POC, call light within reach.

## 2024-12-16 ENCOUNTER — APPOINTMENT (OUTPATIENT)
Dept: ULTRASOUND IMAGING | Facility: HOSPITAL | Age: 89
End: 2024-12-16
Attending: HOSPITALIST
Payer: MEDICARE

## 2024-12-16 PROBLEM — N17.9 AKI (ACUTE KIDNEY INJURY) (HCC): Status: ACTIVE | Noted: 2024-12-16

## 2024-12-16 PROBLEM — N17.9 AKI (ACUTE KIDNEY INJURY): Status: ACTIVE | Noted: 2024-12-16

## 2024-12-16 LAB
ANION GAP SERPL CALC-SCNC: 7 MMOL/L (ref 0–18)
BUN BLD-MCNC: 14 MG/DL (ref 9–23)
CALCIUM BLD-MCNC: 9.1 MG/DL (ref 8.7–10.4)
CHLORIDE SERPL-SCNC: 100 MMOL/L (ref 98–112)
CO2 SERPL-SCNC: 30 MMOL/L (ref 21–32)
CREAT BLD-MCNC: 0.98 MG/DL
EGFRCR SERPLBLD CKD-EPI 2021: 55 ML/MIN/1.73M2 (ref 60–?)
GLUCOSE BLD-MCNC: 124 MG/DL (ref 70–99)
OSMOLALITY SERPL CALC.SUM OF ELEC: 286 MOSM/KG (ref 275–295)
POTASSIUM SERPL-SCNC: 3.3 MMOL/L (ref 3.5–5.1)
SODIUM SERPL-SCNC: 137 MMOL/L (ref 136–145)

## 2024-12-16 PROCEDURE — 76770 US EXAM ABDO BACK WALL COMP: CPT | Performed by: HOSPITALIST

## 2024-12-16 PROCEDURE — 99232 SBSQ HOSP IP/OBS MODERATE 35: CPT | Performed by: HOSPITALIST

## 2024-12-16 RX ORDER — FUROSEMIDE 40 MG/1
40 TABLET ORAL DAILY
Status: DISCONTINUED | OUTPATIENT
Start: 2024-12-17 | End: 2024-12-17

## 2024-12-16 RX ORDER — POTASSIUM CHLORIDE 1500 MG/1
40 TABLET, EXTENDED RELEASE ORAL ONCE
Status: COMPLETED | OUTPATIENT
Start: 2024-12-16 | End: 2024-12-16

## 2024-12-16 RX ORDER — CEPHALEXIN 500 MG/1
500 CAPSULE ORAL 4 TIMES DAILY
Status: DISCONTINUED | OUTPATIENT
Start: 2024-12-16 | End: 2024-12-16 | Stop reason: DRUGHIGH

## 2024-12-16 RX ORDER — CEPHALEXIN 500 MG/1
500 CAPSULE ORAL 2 TIMES DAILY
Status: DISCONTINUED | OUTPATIENT
Start: 2024-12-16 | End: 2024-12-17

## 2024-12-16 RX ORDER — HEPARIN SODIUM 5000 [USP'U]/ML
5000 INJECTION, SOLUTION INTRAVENOUS; SUBCUTANEOUS EVERY 8 HOURS SCHEDULED
Status: DISCONTINUED | OUTPATIENT
Start: 2024-12-16 | End: 2024-12-17

## 2024-12-16 RX ORDER — FUROSEMIDE 40 MG/1
80 TABLET ORAL ONCE
Status: COMPLETED | OUTPATIENT
Start: 2024-12-16 | End: 2024-12-16

## 2024-12-16 RX ORDER — CEPHALEXIN 500 MG/1
500 CAPSULE ORAL 2 TIMES DAILY
Status: DISCONTINUED | OUTPATIENT
Start: 2024-12-16 | End: 2024-12-16

## 2024-12-16 NOTE — PROGRESS NOTES
Assumed care this AM. AxO x2, impulsive, poor safety awareness, pulling at lines and clothes frequently. RA. PO lasix given this AM, BLE non-pitting edema with some redness to lower legs, overall improving, pt denies pain. Incontinent of bowel and bladder. Good appetite. PO abx. Up max assist, PT consulted. POC reviewed with pt and son, call light within reach.

## 2024-12-16 NOTE — OCCUPATIONAL THERAPY NOTE
OCCUPATIONAL THERAPY EVALUATION - INPATIENT     Room Number: 318/318-A  Evaluation Date: 12/16/2024  Type of Evaluation: Initial  Presenting Problem: BLE redness, swelling, cellulitis    Physician Order: IP Consult to Occupational Therapy  Reason for Therapy: ADL/IADL Dysfunction and Discharge Planning    OCCUPATIONAL THERAPY ASSESSMENT   Patient is currently functioning near baseline with toileting, lower body dressing, and dynamic standing balance. Prior to admission, patient's baseline is supervision to min assist for most ADLs and transfers, total assist for bathing while seated on shower chair.  Patient required max-total assist for LB dressing to knees and toileting this session as a result of the following impairments: volition, decreased functional reach, decreased endurance, pain, cognitive deficits (baseline dementia), decreased insight to deficits, and decreased safety awareness. Occupational Therapy will continue to follow for duration of hospitalization.    Patient will benefit from continued skilled OT Services at discharge to promote prior level of function and safety with additional support and return home with home health OT    History Related to Current Admission: Patient is a 89 year old female admitted on 12/14/2024 with Presenting Problem: BLE redness, swelling, cellulitis. Co-Morbidities : moderate dementia, blindness right eye, hyperlipidemia    WEIGHT BEARING RESTRICTION       Recommendations for nursing staff:   Transfers: 1-person  Toileting location: toilet    EVALUATION SESSION:  Patient Start of Session: sitting EOB    FUNCTIONAL TRANSFER ASSESSMENT  Sit to Stand: Edge of Bed  Edge of Bed: Contact Guard Assist    BED MOBILITY  Supine to Sit : Supervision  Sit to Supine (OT): Supervision    BALANCE ASSESSMENT     FUNCTIONAL ADL ASSESSMENT  LB Dressing Seated: Dependent (total to don B socks, patient declined attempting)  Toileting Seated: Dependent (total for brief management/pericare in  supine (declined attempting toileting/toilet transfer in bathroom, preferred bed level))    ACTIVITY TOLERANCE:                          O2 SATURATIONS       COGNITION  Overall Cognitive Status:  Impaired  Attention Span:  attends with cues to redirect and difficulty attending to directions  Orientation Level:  oriented to person, disoriented to place, disoriented to time, and disoriented to situation  Following Commands:  follows one-step commands inconsistently  Safety Judgement:  decreased awareness of need for assistance  Awareness of Deficits:  decreased awareness of deficits    Upper Extremity   ROM: not formally tested but appears within functional limits   Strength: not formally tested but appears within functional limits     EDUCATION PROVIDED  Patient Education : Role of Occupational Therapy; Functional Transfer Techniques; Fall Prevention; Weight Bear Status; Posture/Positioning; Proper Body Mechanics  Patient's Response to Education: Verbalized Understanding; Requires Further Education    Equipment used: RW  Demonstrates functional use, Would benefit from additional trial      Therapist comments: Patient received in supine, pleasantly confused but participatory; grossly supervision to [facility] for bed mobility, standing, and functional mobility to mercedes with RW to simulate distance to bathroom; patient did require total assist of one to don B socks and for toileting (brief soiled), however anticipate this is more volitional at this time as patient declined attempting these activities herself despite encouragement. Will continue to follow.     Patient End of Session: Needs met;Call light within reach;RN aware of session/findings;In bed;With  staff;All patient questions and concerns addressed;Hospital anti-slip socks;Alarm set    OCCUPATIONAL PROFILE    HOME SITUATION  Type of Home: Assisted living facility ([facility])  Home Layout: One level  Lives With: Staff 24 hours    Toilet and Equipment: Comfort  height toilet;Grab bar  Shower/Tub and Equipment: Walk-in shower;Grab bar;Shower chair  Other Equipment: None    Occupation/Status: retired     Drives: No  Patient Regularly Uses: Rolling walker;Wheelchair    Prior Level of Function: Per report of PT, who spoke with staff at [facility], patient is typically CGA to min assist for bed mobility, standing, functional mobility via RW, toileting and toilet transfers; setup assist for UB/LB dressing; total assist for UB/LB bathing while seated on shower chair. They state she is typically pleasantly confused.    SUBJECTIVE   \"I want to go to [place]. That's where my parents live.\"    PAIN ASSESSMENT  Rating: Unable to rate  Location: BLE with activity  Management Techniques: Activity promotion;Repositioning;Relaxation;Nurse notified    OBJECTIVE  Precautions: Bed/chair alarm  Fall Risk: High fall risk    ASSESSMENTS    AM-PAC ‘6-Clicks’ Inpatient Daily Activity Short Form  -   Putting on and taking off regular lower body clothing?: A Lot  -   Bathing (including washing, rinsing, drying)?: A Lot  -   Toileting, which includes using toilet, bedpan or urinal? : A Lot  -   Putting on and taking off regular upper body clothing?: None  -   Taking care of personal grooming such as brushing teeth?: None  -   Eating meals?: None    AM-PAC Score:  Score: 18  Approx Degree of Impairment: 46.65%  Standardized Score (AM-PAC Scale): 38.66    ADDITIONAL TESTS     NEUROLOGICAL FINDINGS      COGNITION ASSESSMENTS     PLAN  OT Device Recommendations: TBD  OT Treatment Plan: Balance activities;ADL training;Functional transfer training;Endurance training;Patient/Family education;Patient/Family training;Compensatory technique education  Rehab Potential : Good  Frequency: 3x/week  Number of Visits to Meet Established Goals: 3    ADL GOALS   Patient will perform lower body dressing with setup  Patient will perform toileting with supervision    FUNCTIONAL TRANSFER GOALS   Patient will  transfer to toilet with supervision    Patient Evaluation Complexity Level:   Occupational Profile/Medical History LOW - Brief history including review of medical or therapy records    Specific performance deficits impacting engagement in ADL/IADL LOW  1 - 3 performance deficits    Client Assessment/Performance Deficits LOW - No comorbidities nor modifications of tasks    Clinical Decision Making LOW - Analysis of occupational profile, problem-focused assessments, limited treatment options    Overall Complexity LOW     OT Session Time: 20 minutes  Self-Care Home Management: 8 minutes

## 2024-12-16 NOTE — PLAN OF CARE
Received pt at 1930. Pt is A&O x 2; follows commands, able to convince pt to permit regaining IV access for HS dose of Ancef which was obtained and completed but on next evening rounding despite gauze dressing, pt had removed IV and placed on side table. Pt is on RA, VSS, reg diet. Pt is incontinent of bowel and bladder. Pt denies pain and ambulates with 1 assist, GB and walker. Shift assessment performed, HS meds given. NOC safety plan in place; bed in low position, bed alarm on, call light and personal items within reach, pt encouraged to call staff for assistance.

## 2024-12-16 NOTE — PROGRESS NOTES
[facility]   part of [facility]     Hospitalist Progress Note     Ophelia Parker Patient Status:  Observation    1935 MRN [id number]   Location [facility] 3NW-A Attending Gilma Costa MD   Hosp Day # 0 PCP Mohinder Perera MD     Chief Complaint:   Chief Complaint   Patient presents with    Cellulitis       Subjective:     Patient denies any complaints     Objective:    Review of Systems:   6  point ROS completed and was negative, except for pertinent positive and negatives stated in subjective.    Vital signs:  Temp:  [97.7 °F (36.5 °C)-98.2 °F (36.8 °C)] 98.1 °F (36.7 °C)  Pulse:  [69-81] 71  Resp:  [16-18] 17  BP: (107-131)/(45-69) 107/45  SpO2:  [94 %-100 %] 96 %    Physical Exam:    General: No acute distress.   Respiratory: Clear to auscultation bilaterally. No wheezes. No rhonchi.  Cardiovascular: S1, S2. Regular rate and rhythm. No murmurs.  Abdomen: Soft, nontender, nondistended.    Extremities: bilateral edema with redness and tenderness and warmth, improving    Diagnostic Data:    Labs:  Recent Labs   Lab 24   WBC 8.1   HGB 11.1*   MCV 93.0   .0       Recent Labs   Lab 24  0457   * 124*   BUN 18 14   CREATSERUM 1.30* 0.98   CA 9.4 9.1   ALB 4.0  --     137   K 3.8 3.3*    100   CO2 31.0 30.0   ALKPHO 97  --    AST 20  --    ALT 12  --    BILT 0.8  --    TP 6.9  --        Estimated Creatinine Clearance: 28 mL/min (based on SCr of 0.98 mg/dL).    No results for input(s): \"PTP\", \"INR\" in the last 168 hours.         COVID-19 Lab Results    COVID-19  Lab Results   Component Value Date    COVID19 Not Detected 2023    COVID19 Not Detected 2023    COVID19 Not Detected 2022       Pro-Calcitonin  No results for input(s): \"PCT\" in the last 168 hours.    Cardiac  No results for input(s): \"TROP\", \"PBNP\" in the last 168 hours.    Creatinine Kinase  No results for input(s): \"CK\" in the last 168 hours.    Inflammatory  Markers  No results for input(s): \"CRP\", \"KI\", \"LDH\", \"DDIMER\" in the last 168 hours.    No results for input(s): \"TROP\", \"TROPHS\", \"CK\" in the last 168 hours.    Imaging: Imaging data reviewed in Epic.    Medications:    cephalexin  500 mg Oral BID    potassium chloride  40 mEq Oral Once    memantine  5 mg Oral BID    metoprolol succinate ER  50 mg Oral Daily Beta Blocker       Assessment & Plan:        # stasis dermatitis vs bilateral cellulitis; switched to oral lasix and oral empiric abx because she was resistant to new IV attempts and pulled old IV out per RN; hold CCB  # history of right scleral laceration s/p right eye exploration/repair ruptured globe  # HTN   # HLD   # Dementia - mainly short - term memory issues at recent baseline - memantine  # Anemia -monitor  # LIANG -improved with diuresis; check renal ultrasound; hold ARB; resume home 40mg oral lasix dose tomorrow  # edema; could be from CCB but will trial lasix to see if helps, which it seems to be  # hypokalemia-replace per protocol prn  # abnormal u/a.  However, no urinary symptoms; ok to keep off macrobid    Plan of care discussed with patient and RN    Sage Roman MD    Supplementary Documentation:     Quality:  DVT Prophylaxis: scds & heparin  CODE status: see chart  Tyler: no  Central line: no      Estimated date of discharge: 1 day  At this point Ms. Parker is expected to be discharge to: d

## 2024-12-16 NOTE — CM/SW NOTE
Pt is an 89 yo female admitted for bilateral lower leg cellulitis.  Pt is confused and [place].  She is on oral antibiotics.  Pt is a max assist with a walker.  She lives at [facility].  PT/OT to see.  SW to f/u with pt's dc plans pending PT/OT evaluations.     12/16/24 1200   CM/SW Referral Data   Referral Source Social Work (self-referral)   Reason for Referral Discharge planning   Patient Info   Patient's Home Environment Assisted Living   Post Acute Care Provider Upon Admission [facility]   Discharge Needs   Anticipated D/C needs To be determined

## 2024-12-16 NOTE — PHYSICAL THERAPY NOTE
PHYSICAL THERAPY EVALUATION - INPATIENT     Room Number: 318/318-A  Evaluation Date: 12/16/2024  Type of Evaluation: Initial  Physician Order: PT Eval and Treat    Presenting Problem: BLE redness/ swelling >> cellulitis  Co-Morbidities : moderate dementia, blindness right eye, hyperlipidemia  Reason for Therapy: Mobility Dysfunction and Discharge Planning    PHYSICAL THERAPY ASSESSMENT   Patient is a 89 year old female admitted 12/14/2024 for cellulitis.  Prior to admission, patient's baseline is SBA-Deloris with RW.  Patient is currently functioning near baseline with bed mobility, transfers, gait, maintaining seated position, and standing prolonged periods.  Patient is requiring contact guard assist as a result of the following impairments: decreased functional strength, decreased endurance/aerobic capacity, impaired static and dynamic standing balance, impaired coordination, impaired motor planning, decreased muscular endurance, and cognitive deficits (incr confusion, oriented to self only).  Physical Therapy will continue to follow for duration of hospitalization.    Patient will benefit from continued skilled PT Services at discharge to promote prior level of function and safety with additional support and return home with home health PT.    PLAN DURING HOSPITALIZATION  Nursing Mobility Recommendation : 1 Assist  PT Device Recommendation: Rolling walker;Gait belt  PT Treatment Plan: Bed mobility;Body mechanics;Coordination;Endurance;Energy conservation;Patient education;Family education;Gait training;Neuromuscular re-educate;Range of motion;Strengthening;Transfer training;Balance training  Rehab Potential : Fair  Frequency (Obs): 3-5x/week     CURRENT GOALS  Goal #1 Patient is able to demonstrate supine - sit EOB @ level: supervision     Goal #2 Patient is able to demonstrate transfers Sit to/from Stand at assistance level: supervision     Goal #3 Patient is able to ambulate 50 feet with assist device: walker -  rolling at assistance level: supervision     Goal #4    Goal #5    Goal #6    Goal Comments: Goals established on 12/16/2024    PHYSICAL THERAPY MEDICAL/SOCIAL HISTORY  History related to current admission: Patient is a 89 year old female admitted on 12/14/2024 from home for BLE swelling and redness. Pt diagnosed with cellulitis.    HOME SITUATION  Type of Home: Assisted living facility ([facility])  Home Layout: One level                     Lives With: Staff 24 hours    Drives: No   Patient Regularly Uses: Rolling walker;Wheelchair      Prior Level of Maynard:   Writer called facility and spoke to director of nursing who reports the following:   Pt is typically pleasantly confused at baseline. Bed mobility completed at supervision level.  Typically a \"very easy one person assist\" for transfers and ambulation with RW. Staff wheels her down in wheelchair to dining room. Ambulates short distances with RW.   Has had a decline the past 2-3 days prior to admission to hospital and was requiring more assistance.    SUBJECTIVE  \"I'm going to see my mom and dad who live in [place]!\"  \"Right here!\"- when asked where she was  \"With my hands\"- when asked how she feels     OBJECTIVE  Precautions: Bed/chair alarm  Fall Risk: High fall risk    WEIGHT BEARING RESTRICTION     PAIN ASSESSMENT  Rating: Unable to rate  Location: bilateral feet - \"they hurt so bad\" while in standing  Management Techniques: Activity promotion;Body mechanics;Repositioning    COGNITION  Overall Cognitive Status:  Impaired  Orientation Level:  oriented to person  Following Commands:  follows one step commands with repetition  Safety Judgement:  decreased awareness of need for safety  Awareness of Errors:  decreased awareness of errors   Awareness of Deficits:  decreased awareness of deficits    RANGE OF MOTION AND STRENGTH ASSESSMENT  Upper extremity ROM and strength are within functional limits   Lower extremity ROM is within functional  limits   Lower extremity strength is within functional limits     BALANCE  Static Sitting: Fair +  Dynamic Sitting: Fair  Static Standing: Fair -  Dynamic Standing: Fair -    ADDITIONAL TESTS                                    ACTIVITY TOLERANCE                         O2 WALK       NEUROLOGICAL FINDINGS                        AM-PAC '6-Clicks' INPATIENT SHORT FORM - BASIC MOBILITY  How much difficulty does the patient currently have...  Patient Difficulty: Turning over in bed (including adjusting bedclothes, sheets and blankets)?: A Little   Patient Difficulty: Sitting down on and standing up from a chair with arms (e.g., wheelchair, bedside commode, etc.): A Little   Patient Difficulty: Moving from lying on back to sitting on the side of the bed?: A Little   How much help from another person does the patient currently need...   Help from Another: Moving to and from a bed to a chair (including a wheelchair)?: A Little   Help from Another: Need to walk in hospital room?: A Little   Help from Another: Climbing 3-5 steps with a railing?: A Lot     AM-PAC Score:  Raw Score: 17   Approx Degree of Impairment: 50.57%   Standardized Score (AM-PAC Scale): 42.13   CMS Modifier (G-Code): CK    FUNCTIONAL ABILITY STATUS  Gait Assessment   Functional Mobility/Gait Assessment  Gait Assistance: Contact guard assist;Supervision  Distance (ft): 30  Assistive Device: Rolling walker  Pattern: Shuffle    Skilled Therapy Provided   Educated on importance of continued out of bed mobility and ambulation with assistance from nursing staff and use of RW    Pt sitting up on side of bed with RN/PCT present in room> sit to stand to RW> ambulated 30 feet with RW>  returned supine in bed> PCT assisted writer and OT with brief change/pericare as brief was soiled, pt able to independently roll in bed and scoot/reposition in bed    Bed Mobility:  Rolling: indep  Supine to sit: supervision   Sit to supine: supervision     Transfer Mobility:  Sit to  stand: CGA to RW- vc for hand placement   Stand to sit: CGA  Gait = CGA progressing to supervision with RW x 30 feet- kyphotic posturing    Therapist's Comments:   Patient presents sitting up on side of bed with PCT and RN present in room. Discussed role and goal  of physical therapy in hospital setting. Pt in agreement to session- pleasantly confused throughout entirety of session.     Exercise/Education Provided:  Bed mobility  Body mechanics  Energy conservation  Functional activity tolerated  Gait training  Posture  Transfer training    Patient End of Session: Up in chair;Needs met;Call light within reach;RN aware of session/findings;All patient questions and concerns addressed;Hospital anti-slip socks;Alarm set;SCDs in place;Discussed recommendations with /    Patient Evaluation Complexity Level:  History Moderate - 1 or 2 personal factors and/or co-morbidities   Examination of body systems Low -  addressing 1-2 elements   Clinical Presentation Low- Stable   Clinical Decision Making Low Complexity     PT Session Time: 25 minutes  Gait Trainin minutes

## 2024-12-17 VITALS
OXYGEN SATURATION: 97 % | HEART RATE: 62 BPM | TEMPERATURE: 98 F | RESPIRATION RATE: 16 BRPM | WEIGHT: 159.13 LBS | HEIGHT: 60 IN | BODY MASS INDEX: 31.24 KG/M2 | SYSTOLIC BLOOD PRESSURE: 102 MMHG | DIASTOLIC BLOOD PRESSURE: 70 MMHG

## 2024-12-17 LAB — POTASSIUM SERPL-SCNC: 3.6 MMOL/L (ref 3.5–5.1)

## 2024-12-17 PROCEDURE — 99239 HOSP IP/OBS DSCHRG MGMT >30: CPT | Performed by: HOSPITALIST

## 2024-12-17 RX ORDER — CEPHALEXIN 500 MG/1
500 CAPSULE ORAL 2 TIMES DAILY
Qty: 8 CAPSULE | Refills: 0 | Status: SHIPPED | OUTPATIENT
Start: 2024-12-17 | End: 2024-12-21

## 2024-12-17 NOTE — PROGRESS NOTES
A/O to self, RA, tolerating a regular diet.  Pt has no IV access after pulling multiple out 2 nights ago, subsequently switched to PO abx and PO lasix.  Incontinent of bladder and bowel, with multiple bowel movements overnight.  No complaints of pain or nausea.  Appropriate safety measures in place, call light within reach, questions and concerns addressed.

## 2024-12-17 NOTE — DISCHARGE PLANNING
Attempted to call [facility] three separate times - no answer     Left brief VM to son's cellphone \"Darrin\" informing him of pts discharge status     AVS/ discharge paperwork provided to ambulance staff     No future appointments.

## 2024-12-17 NOTE — PROGRESS NOTES
[facility]   part of [facility]     Hospitalist Progress Note     Ophelia Parker Patient Status:  Observation    1935 MRN [id number]   Location [facility] 3NW-A Attending Gilma Costa MD   Hosp Day # 1 PCP Mohinder Perera MD     Chief Complaint:   Chief Complaint   Patient presents with    Cellulitis       Subjective:     No new overnight events    Objective:    Review of Systems:   6  point ROS completed and was negative, except for pertinent positive and negatives stated in subjective.    Vital signs:  Temp:  [97.4 °F (36.3 °C)-98.1 °F (36.7 °C)] 98.1 °F (36.7 °C)  Pulse:  [63-68] 66  Resp:  [16-18] 18  BP: (115-150)/() 150/63  SpO2:  [94 %-97 %] 97 %    Physical Exam:    General: No acute distress.   Respiratory: Clear to auscultation bilaterally. No wheezes. No rhonchi.  Cardiovascular: S1, S2. Regular rate and rhythm. No murmurs.  Abdomen: Soft, nontender, nondistended.    Extremities: bilateral edema with redness and tenderness and warmth, improving    Diagnostic Data:    Labs:  Recent Labs   Lab 24   WBC 8.1   HGB 11.1*   MCV 93.0   .0       Recent Labs   Lab 24  0457 24  0541   * 124*  --    BUN 18 14  --    CREATSERUM 1.30* 0.98  --    CA 9.4 9.1  --    ALB 4.0  --   --     137  --    K 3.8 3.3* 3.6    100  --    CO2 31.0 30.0  --    ALKPHO 97  --   --    AST 20  --   --    ALT 12  --   --    BILT 0.8  --   --    TP 6.9  --   --        Estimated Creatinine Clearance: 28 mL/min (based on SCr of 0.98 mg/dL).    No results for input(s): \"PTP\", \"INR\" in the last 168 hours.         COVID-19 Lab Results    COVID-19  Lab Results   Component Value Date    COVID19 Not Detected 2023    COVID19 Not Detected 2023    COVID19 Not Detected 2022       Pro-Calcitonin  No results for input(s): \"PCT\" in the last 168 hours.    Cardiac  No results for input(s): \"TROP\", \"PBNP\" in the last 168 hours.    Creatinine  Kinase  No results for input(s): \"CK\" in the last 168 hours.    Inflammatory Markers  No results for input(s): \"CRP\", \"KI\", \"LDH\", \"DDIMER\" in the last 168 hours.    No results for input(s): \"TROP\", \"TROPHS\", \"CK\" in the last 168 hours.    Imaging: Imaging data reviewed in Epic.    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  However, no urinary symptoms; ok to keep off macrobid    Plan of care discussed with patient and RN    Jovanna Mayen M.D.  [facility]      Supplementary Documentation:     Quality:  DVT Prophylaxis: scds & heparin  CODE status: see chart  Tyler: no  Central line: no      Estimated date of discharge: 1 day  At this point Ms. Parker is expected to be discharge to: [facility]

## 2024-12-17 NOTE — DISCHARGE INSTRUCTIONS
Sometimes managing your health at home requires assistance.  The [facility] team has recognized your preference to use [facility] [phone].  A representative from the home health agency will contact you or your family to schedule your first visit.

## 2024-12-17 NOTE — CM/SW NOTE
Pt is ready for dc today.  She will return to [facility].  RN to call report to [phone].  Pt needs transport back to [facility] to  pt at 4:00pm today.  PCS form completed.      [facility] aware of pt's dc back to [facility] today.  They will resume HH services with pt.

## 2024-12-17 NOTE — SPIRITUAL CARE NOTE
Spiritual Care Visit Note    Patient Name: Ophelia Parker Date of Spiritual Care Visit: 24   : 1935 Primary Dx: Bilateral lower leg cellulitis       Referred By: Referral From: Patient    Spiritual Care Taxonomy:    Intended Effects: Demonstrate caring and concern    Methods: Offer spiritual/Jewish support;Offer emotional support    Interventions: Acknowledge current situation;Active listening;Ask guided questions;Keyes    Visit Type/Summary:     - Spiritual Care: Offered empathic listening and emotional support. Patient yelled out to patient while  was rounding. Patient asked  for help understanding what was on the wall.  explained that it was a clear bin for discharge paperwork. Patient spoke to  about her parents and upbringing in a way that did not seem to connect with reality.  assessed that patient may benefit from a supportive presence.  asked open ended questions to determine patients ba tradition. Patient told  she is Baptism.  offered to pray the Lord's Prayer. Patient prayed along with , then stopped to listen. Patient appeared to exude a calmness after prayer. Patient thanked  for stopping to talk with her.   remains available as needed for follow up.    Spiritual Care support can be requested via an Epic consult. For urgent/immediate needs, please contact the On Call  at: [place]: ext [phone]    Patrice. Cris Looney Mdiv.

## 2024-12-17 NOTE — PLAN OF CARE
A&Ox1-2 dementia hx. VSS. RA. . Denies chest pain and SOB.   GI: Abdomen soft, nondistended. Passing gas. Belching present.   Denies nausea.   : Voids. Pure-wick catheter in place draining clear yellow urine.   Pain controlled with PRN pain medications.   Up with standby assist/walker   Drains: Pure-wick   Incisions: None  Diet: General diet - tolerating well   Saline locked - tolerating PO intake. All appropriate safety measures in place. All questions and concerns addressed.

## 2024-12-17 NOTE — CM/SW NOTE
PT & OT saw pt:  Anticipated therapy need: Home with Home Healthcare.  Pt is current with [facility] (formerly [facility]   MARK order & updates sent in Aidin.  MAEVE to notify [facility] when pt is ready for dc.

## 2024-12-18 NOTE — DISCHARGE SUMMARY
[place] HOSPITALIST  DISCHARGE SUMMARY     Ophelia Parker Patient Status:  Inpatient    1935 MRN [id number]   Location [facility] 3NW-A Attending No att. providers found   Hosp Day # 1 PCP Mohinder Perera MD     Date of Admission: 2024  Date of Discharge:  2024     Discharge Disposition: Nursing Facility Certified Under Medicaid    Discharge Diagnosis:  Stasis dermatitis with mild cellulitis  LIANG    History of Present Illness:   Ophelia Parker is a 89 year old female with moderate dementia blindness right eye hyperlipidemia who presents to the hospital to be evaluated for lower extremities edema and redness.  Patient is a poor historian most of the details are obtained from the chart.  Patient lives in a nursing home and according to nursing home staff she had no fever or chills vomiting diarrhea chest pain shortness of breath.  There is no history of trauma.  Patient has history of scleral laceration following up with ophthalmology.     Brief Synopsis:   Patient is a 89-year-old female admitted with bilateral lower extremity stasis dermatitis with mild cellulitis.  She was placed on diuretics as well as antibiotics with improvement in her symptoms.  She also had LIANG which improved with diuresis.  Her renal ultrasound was negative for acute changes.  She remained stable and was discharged back to the nursing facility in good condition      Lace+ Score: 44  59-90 High Risk  29-58 Medium Risk  0-28   Low Risk       TCM Follow-Up Recommendation:  LACE > 58: High Risk of readmission after discharge from the hospital.  **Certification    Admission date was 2024.  Inpatient stay was shorter than expected.  Patient's Bilateral lower leg cellulitis was initially serious enough to expect a more lengthy hospitalization but patient improved faster than expected.                 Procedures during hospitalization:   none    Consultants:  none    Discharge Medication List:     Discharge  Medications        START taking these medications        Instructions Prescription details   cephALEXin 500 MG Caps  Commonly known as: Keflex      Take 1 capsule (500 mg total) by mouth 2 (two) times daily for 4 days.   Stop taking on: December 21, 2024  Quantity: 8 capsule  Refills: 0            CONTINUE taking these medications        Instructions Prescription details   acetaminophen 325 MG Tabs  Commonly known as: Tylenol      Take 2 tablets (650 mg total) by mouth every 4 (four) hours as needed for Pain or Fever.   Refills: 0     furosemide 40 MG Tabs  Commonly known as: Lasix      Take 1 tablet (40 mg total) by mouth daily. For edema   Refills: 0     memantine 5 MG Tabs  Commonly known as: Namenda      Take 1 tablet (5 mg total) by mouth 2 (two) times daily.   Refills: 0     metoprolol succinate ER 50 MG Tb24  Commonly known as: Toprol XL      Take 0.5 tablets (25 mg total) by mouth nightly.   Refills: 0            STOP taking these medications      alendronate 70 MG Tabs  Commonly known as: Fosamax        amLODIPine 5 MG Tabs  Commonly known as: Norvasc        atorvastatin 10 MG Tabs  Commonly known as: Lipitor        CALCIUM-VITAMIN D OR        ciprofloxacin 0.3 % Soln  Commonly known as: Ciloxan        erythromycin 5 MG/GM Oint  Commonly known as: Romycin        losartan 25 MG Tabs  Commonly known as: Cozaar        magnesium 250 MG Tabs        Multiple Vitamin Tabs        nitrofurantoin monohydrate macro 100 MG Caps  Commonly known as: Macrobid        nystatin 100,000 Units/g Crea  Commonly known as: Mycostatin        prednisoLONE 1 % Susp  Commonly known as: Pred Forte        ramipril 10 MG Caps  Commonly known as: Altace        triamcinolone 0.1 % Crea  Commonly known as: Kenalog                  Where to Get Your Medications        These medications were sent to [facility] - [place], [place] - [address] [facility] [phone], [phone] [id number] [facility], [facility] [address]       Phone: [phone]   cephALEXin 500 MG Caps         [place] reviewed[place] n/a    Follow-up appointment:   No follow-up provider specified.  Appointments for Next 30 Days 2024 - 2025      None            Vital signs:       Physical Exam:    General: No acute distress   Lungs: clear to auscultation  Cardiovascular: S1, S2  Abdomen: Soft      -----------------------------------------------------------------------------------------------  PATIENT DISCHARGE INSTRUCTIONS: See electronic chart    Jovanna Mayen MD    Total time spent on discharge plannin minutes     The  [employer] Cures Act makes medical notes like these available to patients in the interest of transparency. Please be advised this is a medical document. Medical documents are intended to carry relevant information, facts as evident, and the clinical opinion of the practitioner. The medical note is intended as peer to peer communication and may appear blunt or direct. It is written in medical language and may contain abbreviations or verbiage that are unfamiliar.

## 2025-04-13 ENCOUNTER — HOSPITAL ENCOUNTER (OUTPATIENT)
Facility: HOSPITAL | Age: OVER 89
Setting detail: OBSERVATION
Discharge: NURSING, REHAB OR LONG-TERM CARE | End: 2025-04-15
Attending: EMERGENCY MEDICINE | Admitting: INTERNAL MEDICINE
Payer: MEDICARE

## 2025-04-13 DIAGNOSIS — R60.9 DEPENDENT EDEMA: ICD-10-CM

## 2025-04-13 DIAGNOSIS — L03.119 CELLULITIS OF LOWER EXTREMITY, UNSPECIFIED LATERALITY: ICD-10-CM

## 2025-04-13 DIAGNOSIS — E87.6 HYPOKALEMIA: Primary | ICD-10-CM
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
## 2025-04-13 RX ORDER — MULTIVITAMIN
1 TABLET ORAL DAILY
COMMUNITY

## 2025-04-13 RX ORDER — NYSTATIN 100000 U/G
CREAM TOPICAL EVERY 8 HOURS
COMMUNITY

## 2025-04-13 RX ORDER — LOSARTAN POTASSIUM 25 MG/1
25 TABLET ORAL DAILY
COMMUNITY

## 2025-04-14 ENCOUNTER — APPOINTMENT (OUTPATIENT)
Dept: ULTRASOUND IMAGING | Facility: HOSPITAL | Age: OVER 89
End: 2025-04-14
Attending: EMERGENCY MEDICINE
Payer: MEDICARE

## 2025-04-14 PROBLEM — E87.6 HYPOKALEMIA: Status: ACTIVE | Noted: 2025-04-14

## 2025-04-14 PROBLEM — R60.9 DEPENDENT EDEMA: Status: ACTIVE | Noted: 2025-04-14

## 2025-04-14 PROBLEM — L03.119 CELLULITIS OF LOWER EXTREMITY, UNSPECIFIED LATERALITY: Status: ACTIVE | Noted: 2025-04-14

## 2025-04-14 LAB
ALBUMIN SERPL-MCNC: 4.3 G/DL (ref 3.2–4.8)
ALBUMIN/GLOB SERPL: 1.6 {RATIO} (ref 1–2)
ALP LIVER SERPL-CCNC: 105 U/L (ref 55–142)
ALT SERPL-CCNC: 11 U/L (ref 10–49)
ANION GAP SERPL CALC-SCNC: 8 MMOL/L (ref 0–18)
AST SERPL-CCNC: 24 U/L (ref ?–34)
ATRIAL RATE: 79 BPM
BASOPHILS # BLD AUTO: 0.04 X10(3) UL (ref 0–0.2)
BASOPHILS NFR BLD AUTO: 0.6 %
BILIRUB SERPL-MCNC: 0.4 MG/DL (ref 0.2–0.9)
BUN BLD-MCNC: 25 MG/DL (ref 9–23)
CALCIUM BLD-MCNC: 10 MG/DL (ref 8.7–10.6)
CHLORIDE SERPL-SCNC: 99 MMOL/L (ref 98–112)
CO2 SERPL-SCNC: 30 MMOL/L (ref 21–32)
CREAT BLD-MCNC: 1.3 MG/DL (ref 0.55–1.02)
D DIMER PPP FEU-MCNC: 1.26 UG/ML FEU (ref ?–0.9)
EGFRCR SERPLBLD CKD-EPI 2021: 39 ML/MIN/1.73M2 (ref 60–?)
EOSINOPHIL # BLD AUTO: 0.14 X10(3) UL (ref 0–0.7)
EOSINOPHIL NFR BLD AUTO: 2 %
ERYTHROCYTE [DISTWIDTH] IN BLOOD BY AUTOMATED COUNT: 13.4 %
GLOBULIN PLAS-MCNC: 2.7 G/DL (ref 2–3.5)
GLUCOSE BLD-MCNC: 131 MG/DL (ref 70–99)
HCT VFR BLD AUTO: 35.7 % (ref 35–48)
HGB BLD-MCNC: 11.9 G/DL (ref 12–16)
IMM GRANULOCYTES # BLD AUTO: 0.04 X10(3) UL (ref 0–1)
IMM GRANULOCYTES NFR BLD: 0.6 %
LYMPHOCYTES # BLD AUTO: 1.37 X10(3) UL (ref 1–4)
LYMPHOCYTES NFR BLD AUTO: 19.6 %
MCH RBC QN AUTO: 30.2 PG (ref 26–34)
MCHC RBC AUTO-ENTMCNC: 33.3 G/DL (ref 31–37)
MCV RBC AUTO: 90.6 FL (ref 80–100)
MONOCYTES # BLD AUTO: 0.81 X10(3) UL (ref 0.1–1)
MONOCYTES NFR BLD AUTO: 11.6 %
NEUTROPHILS # BLD AUTO: 4.59 X10 (3) UL (ref 1.5–7.7)
NEUTROPHILS # BLD AUTO: 4.59 X10(3) UL (ref 1.5–7.7)
NEUTROPHILS NFR BLD AUTO: 65.6 %
OSMOLALITY SERPL CALC.SUM OF ELEC: 290 MOSM/KG (ref 275–295)
P AXIS: 12 DEGREES
P-R INTERVAL: 176 MS
PLATELET # BLD AUTO: 253 10(3)UL (ref 150–450)
PLATELETS.RETICULATED NFR BLD AUTO: 3 % (ref 0–7)
POTASSIUM SERPL-SCNC: 3 MMOL/L (ref 3.5–5.1)
PROT SERPL-MCNC: 7 G/DL (ref 5.7–8.2)
Q-T INTERVAL: 404 MS
QRS DURATION: 82 MS
QTC CALCULATION (BEZET): 463 MS
R AXIS: -23 DEGREES
RBC # BLD AUTO: 3.94 X10(6)UL (ref 3.8–5.3)
SODIUM SERPL-SCNC: 137 MMOL/L (ref 136–145)
T AXIS: -12 DEGREES
VENTRICULAR RATE: 79 BPM
WBC # BLD AUTO: 7 X10(3) UL (ref 4–11)

## 2025-04-14 PROCEDURE — 99223 1ST HOSP IP/OBS HIGH 75: CPT | Performed by: INTERNAL MEDICINE

## 2025-04-14 PROCEDURE — 93970 EXTREMITY STUDY: CPT | Performed by: EMERGENCY MEDICINE

## 2025-04-14 RX ORDER — HEPARIN SODIUM 5000 [USP'U]/ML
5000 INJECTION, SOLUTION INTRAVENOUS; SUBCUTANEOUS EVERY 12 HOURS SCHEDULED
Status: DISCONTINUED | OUTPATIENT
Start: 2025-04-14 | End: 2025-04-15

## 2025-04-14 RX ORDER — ONDANSETRON 2 MG/ML
4 INJECTION INTRAMUSCULAR; INTRAVENOUS EVERY 6 HOURS PRN
Status: DISCONTINUED | OUTPATIENT
Start: 2025-04-14 | End: 2025-04-15

## 2025-04-14 RX ORDER — ACETAMINOPHEN 500 MG
500 TABLET ORAL EVERY 4 HOURS PRN
Status: DISCONTINUED | OUTPATIENT
Start: 2025-04-14 | End: 2025-04-15

## 2025-04-14 RX ORDER — METOPROLOL SUCCINATE 25 MG/1
25 TABLET, EXTENDED RELEASE ORAL NIGHTLY
Status: DISCONTINUED | OUTPATIENT
Start: 2025-04-14 | End: 2025-04-15

## 2025-04-14 RX ORDER — POTASSIUM CHLORIDE 1500 MG/1
40 TABLET, EXTENDED RELEASE ORAL ONCE
Status: COMPLETED | OUTPATIENT
Start: 2025-04-14 | End: 2025-04-14

## 2025-04-14 RX ORDER — FUROSEMIDE 40 MG/1
40 TABLET ORAL DAILY
Status: DISCONTINUED | OUTPATIENT
Start: 2025-04-14 | End: 2025-04-15

## 2025-04-14 RX ORDER — LOSARTAN POTASSIUM 25 MG/1
25 TABLET ORAL DAILY
Status: DISCONTINUED | OUTPATIENT
Start: 2025-04-14 | End: 2025-04-15

## 2025-04-14 RX ORDER — MEMANTINE HYDROCHLORIDE 5 MG/1
5 TABLET ORAL 2 TIMES DAILY
Status: DISCONTINUED | OUTPATIENT
Start: 2025-04-14 | End: 2025-04-15

## 2025-04-14 RX ORDER — NYSTATIN 100000 U/G
CREAM TOPICAL EVERY 8 HOURS
Status: DISCONTINUED | OUTPATIENT
Start: 2025-04-14 | End: 2025-04-15

## 2025-04-14 RX ORDER — ACETAMINOPHEN 325 MG/1
650 TABLET ORAL EVERY 4 HOURS PRN
Status: DISCONTINUED | OUTPATIENT
Start: 2025-04-14 | End: 2025-04-15

## 2025-04-14 NOTE — PHYSICAL THERAPY NOTE
PHYSICAL THERAPY EVALUATION - INPATIENT     Room Number: 322/322-A  Evaluation Date: 4/14/2025  Type of Evaluation: Initial  Physician Order: PT Eval and Treat    Presenting Problem: Cellulitis of LE  Co-Morbidities : hypertension, hyperlipidemia, arthritis, pseudophakia of both eyes  Reason for Therapy: Mobility Dysfunction and Discharge Planning    12/14-12/17/24 Stasis dermatitis with mild cellulitis discharge nursing facility     PHYSICAL THERAPY ASSESSMENT   Patient is a 89 year old female admitted 4/13/2025 for Cellulitis of LE.  Prior to admission, patient's baseline is SBA/Min A with RW .  Patient is currently functioning near baseline with bed mobility, transfers, and gait.  Patient is requiring minimal assist as a result of the following impairments: decreased endurance/aerobic capacity, pain, and decreased muscular endurance.  Physical Therapy will continue to follow for duration of hospitalization.    Patient will benefit from continued skilled PT Services at discharge to promote prior level of function and safety with additional support and return home with home health PT.    PLAN DURING HOSPITALIZATION  Nursing Mobility Recommendation : 1 Assist  PT Device Recommendation: Rolling walker  PT Treatment Plan: Bed mobility, Body mechanics, Gait training, Strengthening, Stair training, Transfer training, Balance training  Rehab Potential : Good  Frequency (Obs): 3-5x/week     CURRENT GOALS    Goal #1 Patient is able to demonstrate supine - sit EOB @ level: supervision     Goal #2 Patient is able to demonstrate transfers EOB to/from BSC at assistance level: supervision     Goal #3 Patient is able to ambulate 20 feet with assist device: walker - rolling at assistance level: supervision     Goal #4    Goal #5    Goal #6    Goal Comments: Goals established on 4/14/2025      PHYSICAL THERAPY MEDICAL/SOCIAL HISTORY  History related to current admission: Patient is a 89 year old female admitted on 4/13/2025 from  Home for Cellulitis of LE.    HOME SITUATION  Type of Home: Assisted living facility  Home Layout: One level                     Lives With: Staff 24 hours    Drives: No          Prior Level of [place]: Per EMR, pt is from an [facility]. Pt is a one person assist. Pt is assisted to the dining room with the wheelchair by staff. Pt can use the RW and ambulates short distance.     SUBJECTIVE  \"I need help\"    OBJECTIVE  Precautions: Bed/chair alarm  Fall Risk: High fall risk    WEIGHT BEARING RESTRICTION     PAIN ASSESSMENT  Rating: Unable to rate  Location: R hand       COGNITION  Overall Cognitive Status:  WFL - within functional limits    RANGE OF MOTION AND STRENGTH ASSESSMENT  Upper extremity ROM and strength are within functional limits     Lower extremity ROM is within functional limits     Lower extremity strength is within functional limits     BALANCE  Static Sitting: Fair +  Dynamic Sitting: Fair  Static Standing: Fair -  Dynamic Standing: Fair -    ADDITIONAL TESTS                                    ACTIVITY TOLERANCE                         O2 WALK       NEUROLOGICAL FINDINGS                        AM-PAC '6-Clicks' INPATIENT SHORT FORM - BASIC MOBILITY  How much difficulty does the patient currently have...  Patient Difficulty: Turning over in bed (including adjusting bedclothes, sheets and blankets)?: A Little   Patient Difficulty: Sitting down on and standing up from a chair with arms (e.g., wheelchair, bedside commode, etc.): A Little   Patient Difficulty: Moving from lying on back to sitting on the side of the bed?: A Little   How much help from another person does the patient currently need...   Help from Another: Moving to and from a bed to a chair (including a wheelchair)?: A Little   Help from Another: Need to walk in hospital room?: A Lot   Help from Another: Climbing 3-5 steps with a railing?: A Lot     AM-PAC Score:  Raw Score: 16   Approx Degree of Impairment: 54.16%   Standardized Score (AM-PAC  Scale): 40.78   CMS Modifier (G-Code): CK    FUNCTIONAL ABILITY STATUS  Gait Assessment   Functional Mobility/Gait Assessment  Gait Assistance: Not tested    Skilled Therapy Provided     Bed Mobility:  Rolling: NT  Supine to sit: NT   Sit to supine: Min A. Pt require Min A to bring the LE up to supine    Transfer Mobility:  Sit to stand: Min/mod A   Stand to sit: Min A  Gait =NT    Therapist's Comments: Pt was observed to have increase confusion with at times to be irritated. Pt reported that she is not going to walk however pt was agreeable to go from chair to bed. Pt was observed no initiation to go from sit to stand until pt was being assisted from sit to stand. Pt was able to stand with min A but required tactile cues to go from bed to chair. At this time pt's participation level is limited due to cognition.      Exercise/Education Provided:  Bed mobility  Body mechanics  Gait training  Transfer training    Patient End of Session: In bed, Needs met, Call light within reach, RN aware of session/findings, All patient questions and concerns addressed, Alarm set, With  staff      Patient Evaluation Complexity Level:  History Moderate - 1 or 2 personal factors and/or co-morbidities   Examination of body systems Low -  addressing 1-2 elements   Clinical Presentation Low- Stable   Clinical Decision Making Low Complexity       PT Session Time: 23 minutes  Therapeutic Activity: 15 minutes

## 2025-04-14 NOTE — ED QUICK NOTES
Orders for admission, patient is aware of plan and ready to go upstairs. Any questions, please call ED RN Zulema at extension [phone].     Patient Covid vaccination status: Fully vaccinated     COVID Test Ordered in ED: None    COVID Suspicion at Admission: N/A    Running Infusions: Medication Infusions[1]     Mental Status/LOC at time of transport: a/ox1    Other pertinent information:   CIWA score: N/A   NIH score:  N/A             [1]

## 2025-04-14 NOTE — PLAN OF CARE
Received patient to the unit from the ER. Patient very suspicious of staff. She is hard of hearing and her right eye is cloudy. She is able to turn easier to her right daniel than her left. She was wearing a depends with a pad inside and was incontinent of urine. Ski assessment was completed with 2 Rns - Please review flowsheet and media pictures - left upper back - bruising, low mid back - old surgical scaring, under each breast - skin intact but reddened with odor, BLE red, warm and pitting. Patient repeatedly attempted to remove her IV, pulse oximeter and wouldn't allow for staff the apply the remote telemetry monitor. Hospitalist suggested bilateral hand mitts - patient became more agitated when RN attempted to apply mitts, patient was batting at nurse with the mitts and said to nurse \"I'll bite your hand right off. Don't touch me!'. Explained the purpose of the mitts as the pulse ox, tele monitor and IV are all needed as part of her care - patient became more agitated. MD contacted and she discontinued the mitts and ordered a safety sitter for the patient. Fall precautions in place for the patient. She can be complimentary to staff at times, but become agitated and irritable when care is being provided.

## 2025-04-14 NOTE — ED QUICK NOTES
Patient removed IV to left forearm, new IV inserted to right hand. Patient continues to fidget with IV. Coband used to cover IV.

## 2025-04-14 NOTE — ED PROVIDER NOTES
Patient Seen in: [facility]    History     Chief Complaint   Patient presents with    Fall     Stated Complaint: unwitnessed fall from [facility]; a/ox1 at baseline    Subjective:   HPI      89-year-old female brought in by ambulance from [facility] for an unwitnessed fall.  Patient denies hitting her head is not on blood thinners but states both her lower extremities have been hurting.  Denies any other complaint but patient is extremely hard of hearing    Objective:     Past Medical History:    Arthritis    Cataract    Epiretinal membrane (ERM) of both eyes    Essential hypertension    Hyperlipidemia    Macular degeneration    PCO (posterior capsular opacification), bilateral    Posterior vitreous detachment    Presbyopia    Pseudophakia of both eyes    S/P YAG capsulotomy              Past Surgical History:   Procedure Laterality Date    Back surgery      Cataract      Cataract extraction w/ intraocular lens  implant, bilateral      Hysterectomy      Tonsillectomy      Yag capsulotomy - od - right eye Right Dr. Coats 10/18/2016    Yag capsulotomy - os - left eye                  Social History     Socioeconomic History    Marital status:    Tobacco Use    Smoking status: Never    Smokeless tobacco: Never   Substance and Sexual Activity    Alcohol use: Not Currently    Drug use: Never     Social Drivers of Health     Food Insecurity: No Food Insecurity (12/15/2024)    Food Insecurity     Food Insecurity: Never true   Transportation Needs: No Transportation Needs (12/15/2024)    Transportation Needs     Lack of Transportation: No   Housing Stability: Low Risk  (12/15/2024)    Housing Stability     Housing Instability: No                  Physical Exam     ED Triage Vitals [04/13/25 2330]   /62   Pulse 83   Resp 18   Temp 97.7 °F (36.5 °C)   Temp src Temporal   SpO2 96 %   O2 Device None (Room air)       Current Vitals:   Vital Signs  BP: 136/62  Pulse: 83  Resp:  18  Temp: 97.7 °F (36.5 °C)  Temp src: Temporal    Oxygen Therapy  SpO2: 96 %  O2 Device: None (Room air)        Physical Exam    Vital signs reviewed  General appearance: Patient is alert and in no acute distress, very hard of hearing  HEENT: 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  Mucous membranes are moist, there is no erythema or exudate in the posterior pharynx  Neck: Supple no JVD no lymphadenopathy no meningismus no carotid bruit  CV: Regular rate and rhythm no murmur rub  Respiratory: Clear to auscultation bilaterally no crackles no wheezes no accessory muscle use  Abdomen: Soft nontender nondistended, no rebound no guarding  no hepatosplenomegaly bowel sounds are present , no pulsatile mass  Extremities: Bilateral lower extremity erythema and pitting edema.  Lower extremities are warm to touch  Neuro: Cranial nerves II through XII intact with no gross focal sensory or motor abnormality.      ED Course     Labs Reviewed   COMP METABOLIC PANEL (14) - Abnormal; Notable for the following components:       Result Value    Glucose 131 (*)     Potassium 3.0 (*)     BUN 25 (*)     Creatinine 1.30 (*)     eGFR-Cr 39 (*)     All other components within normal limits   CBC WITH DIFFERENTIAL WITH PLATELET - Abnormal; Notable for the following components:    HGB 11.9 (*)     All other components within normal limits   D-DIMER - Abnormal; Notable for the following components:    D-Dimer 1.26 (*)     All other components within normal limits   SCAN SLIDE   RAINBOW DRAW LAVENDER   RAINBOW DRAW LIGHT GREEN   RAINBOW DRAW BLUE     EKG    Rate, intervals and axes as noted on EKG Report.  Rate: 79  Rhythm: Sinus Rhythm  Reading: Normal sinus rhythm                Patient was evaluated the emergency department will have a CBC chemistry and a D-dimer.  I did review prior ER note from December 14, 2024 and patient was admitted for bilateral lower extremity cellulitis very similar to today's encounter.  I am going to check a D-dimer  just to make sure no DVT.  CBC and chemistry was ordered.  The rest of exam was unremarkable        Patient was found to be hypokalemic and will give oral potassium supplementation     Patient's ultrasound showed no DVT.  Do feel she just has bad cellulitis with some dependent edema.  She be given dose IV antibiotics.  Will admit her for further monitoring IV antibiotics.  Discussed case with the hospitalist agrees with plan.        MDM      Differential diagnosis reflecting the complexity of care include: Fall, cellulitis, DVT    Comorbidities that add complexity to management include: Macular degeneration, arthritis    External chart review was done and was noted: Hospital note from 12/14/2024 was reviewed and patient was admitted for diuresis along with IV antibiotics and cellulitis improved    History obtained by an independent source was from: Medics or who gave the bulk of the history    Discussions of management was done with: Hospitalist who agrees with plan    My independent interpretation of studies of: Ultrasound shows some dependent edema but no DVT      Shared decision making was done by myself patient and the hospitalist.  Patient will be admitted for IV antibiotics potassium supplementation and further workup        Admission disposition: 4/14/2025  1:52 AM           Medical Decision Making      Disposition and Plan     Clinical Impression:  1. Hypokalemia    2. Dependent edema    3. Cellulitis of lower extremity, unspecified laterality    4. Fall, initial encounter         Disposition:  Admit  4/14/2025  1:52 am    Follow-up:  No follow-up provider specified.        Medications Prescribed:  Current Discharge Medication List          Supplementary Documentation: Patient was evaluated in the emergency department and at this point patient will need admission for further management of medical condition.  Patient was stable in the emergency department and will be transferred to floor for further  definitive care.  Patient's questions were answered.    This note was prepared using Dragon Medical voice recognition dictation software. As a result errors may occur. When identified these errors have been corrected. While every attempt is made to correct errors during dictation discrepancies may still exist        Hospital Problems       Present on Admission  Date Reviewed: 6/25/2020          ICD-10-CM Noted POA    * (Principal) Hypokalemia E87.6 4/14/2025 Unknown

## 2025-04-14 NOTE — ED INITIAL ASSESSMENT (HPI)
Patient from [facility] here for unwitnessed fall. Unknown LOC, no blood thinners, patient with c/o bilateral leg pain per ems. Patient denies pain upon arrival.

## 2025-04-14 NOTE — H&P
[facility]                                                               [facility]         Ophelia Parker Patient Status:  Observation    1935 MRN [id number]   Location [facility] 3NW-A Attending Sara Anderson MD   Hosp Day # 0 PCP Mohinder Perera MD     Chief Complaint: Bilateral leg redness, unwitnessed fall at skilled nursing facility    History of Present Illness:  Ophelia Parker is a 89 year old female admitted with bilateral leg redness, unwitnessed fall at skilled nursing facility.  Patient has dementia and pleasantly confused.  Afebrile.  Denies any chest pain shortness of breath.  Denies any abdominal pain.  No nausea vomiting.  In the emergency room noticed to have bilateral leg erythema and warmth with tenderness on palpation    History:  Past Medical History[1]  Past medical history of hypertension, hyperlipidemia, arthritis, pseudophakia of both eyes  Past Surgical History[2]  Past surgical history of cataract surgery, back surgery, tonsillectomy, hysterectomy, YAG capsulotomy  Family history:  Family History[3]   Reviewed    Social history:   reports that she has never smoked. She has never used smokeless tobacco. She reports that she does not currently use alcohol. She reports that she does not use drugs.    Allergies:  Allergies[4]  Tegaderm  Home Medications:  Prescriptions Prior to Admission[5]    Review of Systems:  A comprehensive 14 point review of systems was completed.  Pertinent positives and negatives noted in the the HPI.    Physical Exam:     Vital signs: Blood pressure 125/73, pulse 81, temperature 97.8 °F (36.6 °C), temperature source Oral, resp. rate 22, weight 173 lb 9.6 oz (78.7 kg), SpO2 96%.  General: No acute distress.   HEENT: Moist mucous membranes.  Right eye cornea opaque  Respiratory: Clear to auscultation bilaterally.  No wheezes. No rhonchi.  Cardiovascular: S1, S2.  Regular rate and rhythm.  No murmurs. Equal pulses   Abdomen: Soft,  nontender, nondistended.  Positive bowel sounds. No rebound tenderness  Neurologic: No focal neurological deficits.  Musculoskeletal: Full range of motion of all extremities.  No swelling noted.  Psychiatric: Appropriate mood and affect.      Diagnostic Data:      Laboratory Data:   Lab Results   Component Value Date    WBC 7.0 04/13/2025    HGB 11.9 04/13/2025    HCT 35.7 04/13/2025    .0 04/13/2025    CREATSERUM 1.30 04/13/2025    BUN 25 04/13/2025     04/13/2025    K 3.0 04/13/2025    CL 99 04/13/2025    CO2 30.0 04/13/2025     04/13/2025    CA 10.0 04/13/2025    ALB 4.3 04/13/2025    ALKPHO 105 04/13/2025    BILT 0.4 04/13/2025    TP 7.0 04/13/2025    AST 24 04/13/2025    ALT 11 04/13/2025    DDIMER 1.26 04/13/2025         Imaging:  Imaging data reviewed in Epic.  Workup done in ER include ultrasound venous duplex of bilateral lower extremities is pending at this time.    ASSESSMENT / PLAN:     #Bilateral lower extremity cellulitis with underlying chronic lymphedema  #Chronic lymphedema  IV antibiotics with IV Ancef  Wound care consult for lymphedema  Keep legs elevated  #Hypokalemia  Replace with electrolyte protocol  #Fall at skilled nursing facility prior to admission  Fall precautions  PT OT eval  #Hypertension  Continue home metoprolol  #Hyperlipidemia  Not on any cholesterol medications at home      Quality:  DVT Prophylaxis: DVT Mechanical Prophylaxis:        DVT Pharmacologic Prophylaxis   Medication    heparin (Porcine) 5000 UNIT/ML injection 5,000 Units              CODE status:   Code Status: Full Code  Tyler: External urinary catheter in place      Plan of care discussed with patient      Discussed with ER Physician.  My colleague Dr. Jorge Luis Servin will follow from morning today      Sara Anderson MD  4/14/2025  3:58 AM         [1]   Past Medical History:   Arthritis    Cataract    Epiretinal membrane (ERM) of both eyes    Essential hypertension    Hyperlipidemia    Macular  degeneration    PCO (posterior capsular opacification), bilateral    Posterior vitreous detachment    Presbyopia    Pseudophakia of both eyes    S/P YAG capsulotomy   [2]   Past Surgical History:  Procedure Laterality Date    Back surgery      Cataract      Cataract extraction w/ intraocular lens  implant, bilateral      Hysterectomy      Tonsillectomy      Yag capsulotomy - od - right eye Right Dr. Coats 10/18/2016    Yag capsulotomy - os - left eye     [3] History reviewed. No pertinent family history.  [4]   Allergies  Allergen Reactions    Tegaderm Ag Mesh 2\"X2\" [Dome-Paste Bandage] RASH   [5]   Medications Prior to Admission   Medication Sig Dispense Refill Last Dose/Taking    losartan 25 MG Oral Tab Take 1 tablet (25 mg total) by mouth daily.   Taking    Multiple Vitamin (ONE-DAILY MULTI VITAMINS) Oral Tab Take 1 tablet by mouth daily.   Taking    nystatin 100,000 Units/g External Cream Apply topically every 8 (eight) hours.   Taking    acetaminophen 325 MG Oral Tab Take 2 tablets (650 mg total) by mouth every 4 (four) hours as needed for Pain or Fever.   Taking As Needed    furosemide 40 MG Oral Tab Take 1 tablet (40 mg total) by mouth in the morning. For edema.   Taking    memantine 5 MG Oral Tab Take 1 tablet (5 mg total) by mouth in the morning and 1 tablet (5 mg total) before bedtime.   Taking    Metoprolol Succinate ER 50 MG Oral Tablet 24 Hr Take 0.5 tablets (25 mg total) by mouth nightly.   Taking

## 2025-04-14 NOTE — OCCUPATIONAL THERAPY NOTE
OCCUPATIONAL THERAPY EVALUATION - INPATIENT     Room Number: 322/322-A  Evaluation Date: 4/14/2025  Type of Evaluation: Initial  Presenting Problem: Cellulitis of lower extremity, unspecified laterality, unwitnessed fall at facility    Physician Order: IP Consult to Occupational Therapy  Reason for Therapy: ADL/IADL Dysfunction and Discharge Planning    OCCUPATIONAL THERAPY ASSESSMENT   Patient is currently functioning below baseline with toileting, lower body dressing, bed mobility, transfers, static sitting balance, dynamic sitting balance, static standing balance, dynamic standing balance, maintaining seated position, functional standing tolerance, energy conservation strategies, and aerobic capacity. Prior to admission, patient's baseline is per EMR, assist from ADLs from staff.  Patient is requiring maximum assistance as a result of the following impairments: decreased functional strength, decreased functional reach, decreased endurance, cognitive deficits (hx of dementia), and decreased safety awareness. Occupational Therapy will continue to follow for duration of hospitalization.    Patient will benefit from continued skilled OT Services at discharge to promote prior level of function and safety with additional support and return home with home health OT    History Related to Current Admission: Patient is a 89 year old female admitted on 4/13/2025 with Presenting Problem: Cellulitis of lower extremity, unspecified laterality, unwitnessed fall at facility. Co-Morbidities : hypertension, hyperlipidemia, arthritis, pseudophakia of both eyes    WEIGHT BEARING RESTRICTION       Recommendations for nursing staff:   Transfers: up x 1-2 assist, stand pivot transfer, min-modA  Toileting location: Bedlevel, may progress to stand pivot to commode with nursing staff    EVALUATION SESSION:  Patient Start of Session: seated upright in chair, staff at bedside    FUNCTIONAL TRANSFER ASSESSMENT  Sit to Stand: Chair  Chair:  Moderate Assist    BED MOBILITY  Supine to Sit : Not tested  Sit to Supine (OT): Moderate Assist    BALANCE ASSESSMENT  Static Sitting: Supervision  Static Standing: Moderate Assist    FUNCTIONAL ADL ASSESSMENT  LB Dressing Seated: Maximum Assist (socks)  Toileting Seated: Maximum Assist (purewick)    ACTIVITY TOLERANCE: Pt tolerated stand pivot transfer from chair > bed this date with modA, required modA for bed mobility.                         O2 SATURATIONS       COGNITION  Arousal/Alertness:  appropriate responses to stimuli  Orientation Level:  oriented to person  Following Commands:  follows multistep commands with increased time and follows multistep commands with repetition  Perseveration: perseverates during ADLs    Upper Extremity   ROM: within functional limits  Strength: within functional limits  EDUCATION PROVIDED  Patient Education : Role of Occupational Therapy; Plan of Care; Functional Transfer Techniques; Fall Prevention; Posture/Positioning; Proper Body Mechanics  Patient's Response to Education: Requires Further Education; Demonstrates Poor Carry Over to Information    Equipment used: none     Therapist comments: RN cleared pt for session, received sitting upright in chair, asleep, sitter at bedside. Pt complaining of IV in hand, also stating she needed new socks however PCT stated she just gave her new socks. Pt appeared to get somewhat agitated during session, shoved away RW when placed in front of her. Pt tolerated stand pivot transfer from chair > bed with verbal encouragement, required modA for positioning into bed, appeared to take blankets and cover herself with them.     Patient End of Session: In bed, Needs met, With  staff, Call light within reach, RN aware of session/findings, All patient questions and concerns addressed, Hospital anti-slip socks, Alarm set    OCCUPATIONAL PROFILE    HOME SITUATION  Type of Home: Assisted living facility  Home Layout: One level  Lives With: Staff 24  hours                     Drives: No       Prior Level of Function: Pt is poor historian; attempted to call facility this date, however no answer. Per last OT eval in Dec 2024: \"Per report of PT, who spoke with staff at [facility], patient is typically CGA to min assist for bed mobility, standing, functional mobility via RW, toileting and toilet transfers; setup assist for UB/LB dressing; total assist for UB/LB bathing while seated on shower chair. They state she is typically pleasantly confused.\"    SUBJECTIVE   \"Can you find another pair of socks\"    PAIN ASSESSMENT  Ratin  Location: denies this date       OBJECTIVE  Precautions: Bed/chair alarm  Fall Risk: High fall risk    ASSESSMENTS    AM-PAC ‘6-Clicks’ Inpatient Daily Activity Short Form  -   Putting on and taking off regular lower body clothing?: A Lot  -   Bathing (including washing, rinsing, drying)?: A Lot  -   Toileting, which includes using toilet, bedpan or urinal? : A Lot  -   Putting on and taking off regular upper body clothing?: A Lot  -   Taking care of personal grooming such as brushing teeth?: A Little  -   Eating meals?: A Little    AM-PAC Score:  Score: 14  Approx Degree of Impairment: 59.67%  Standardized Score (AM-PAC Scale): 33.39    ADDITIONAL TESTS     NEUROLOGICAL FINDINGS      COGNITION ASSESSMENTS     PLAN  OT Device Recommendations: TBD  OT Treatment Plan: Balance activities, Energy conservation/work simplification techniques, ADL training, IADL training, Functional transfer training, UE strengthening/ROM, Endurance training, Patient/Family education, Patient/Family training, Equipment eval/education, Compensatory technique education, Continued evaluation  Rehab Potential : Good  Frequency: 3-5x/week  Number of Visits to Meet Established Goals: 4    ADL Goals   Patient will perform lower body dressing:  with setup and with supervision  Patient will perform toileting: with min assist and with bedside commode    Functional  Transfer Goals  Patient will transfer from supine to sit:  with min assist  Patient will transfer from sit to stand:  with min assist  Patient will transfer to bedside commode:  with min assist      Patient Evaluation Complexity Level:   Occupational Profile/Medical History LOW - Brief history including review of medical or therapy records    Specific performance deficits impacting engagement in ADL/IADL MODERATE  3 - 5 performance deficits   Client Assessment/Performance Deficits MODERATE - Comorbidities and min to mod modifications of tasks    Clinical Decision Making LOW - Analysis of occupational profile, problem-focused assessments, limited treatment options    Overall Complexity LOW     OT Session Time: 15 minutes  Self-Care Home Management: 15 minutes

## 2025-04-14 NOTE — PLAN OF CARE
Pt adm w hypokalemia & frequent falls. Hospitalist rounded. Pt's son stated Pt  has chronic lymphedema. RN notified hospitalist, pt receiving IV abx  Pt has safety sitter for pulling at lines.     Problem: PAIN - ADULT  Goal: Verbalizes/displays adequate comfort level or patient's stated pain goal  Description: INTERVENTIONS:- Encourage pt to monitor pain and request assistance- Assess pain using appropriate pain scale- Administer analgesics based on type and severity of pain and evaluate response- Implement non-pharmacological measures as appropriate and evaluate response- Consider cultural and social influences on pain and pain management- Manage/alleviate anxiety- Utilize distraction and/or relaxation techniques- Monitor for opioid side effects- Notify MD/LIP if interventions unsuccessful or patient reports new pain- Anticipate increased pain with activity and pre-medicate as appropriate  Outcome: Progressing     Problem: SAFETY ADULT - FALL  Goal: Free from fall injury  Description: INTERVENTIONS:- Assess pt frequently for physical needs- Identify cognitive and physical deficits and behaviors that affect risk of falls.- [place] fall precautions as indicated by assessment.- Educate pt/family on patient safety including physical limitations- Instruct pt to call for assistance with activity based on assessment- Modify environment to reduce risk of injury- Provide assistive devices as appropriate- Consider OT/PT consult to assist with strengthening/mobility- Encourage toileting schedule  Outcome: Progressing

## 2025-04-15 VITALS
DIASTOLIC BLOOD PRESSURE: 56 MMHG | SYSTOLIC BLOOD PRESSURE: 119 MMHG | OXYGEN SATURATION: 97 % | BODY MASS INDEX: 34 KG/M2 | RESPIRATION RATE: 18 BRPM | TEMPERATURE: 98 F | HEART RATE: 78 BPM | WEIGHT: 173.63 LBS

## 2025-04-15 LAB
ANION GAP SERPL CALC-SCNC: 9 MMOL/L (ref 0–18)
BASOPHILS # BLD AUTO: 0.03 X10(3) UL (ref 0–0.2)
BASOPHILS NFR BLD AUTO: 0.4 %
BUN BLD-MCNC: 16 MG/DL (ref 9–23)
CALCIUM BLD-MCNC: 9.4 MG/DL (ref 8.7–10.6)
CHLORIDE SERPL-SCNC: 102 MMOL/L (ref 98–112)
CO2 SERPL-SCNC: 31 MMOL/L (ref 21–32)
CREAT BLD-MCNC: 1.16 MG/DL (ref 0.55–1.02)
EGFRCR SERPLBLD CKD-EPI 2021: 45 ML/MIN/1.73M2 (ref 60–?)
EOSINOPHIL # BLD AUTO: 0.2 X10(3) UL (ref 0–0.7)
EOSINOPHIL NFR BLD AUTO: 2.9 %
ERYTHROCYTE [DISTWIDTH] IN BLOOD BY AUTOMATED COUNT: 13.4 %
GLUCOSE BLD-MCNC: 107 MG/DL (ref 70–99)
HCT VFR BLD AUTO: 33 % (ref 35–48)
HGB BLD-MCNC: 10.8 G/DL (ref 12–16)
IMM GRANULOCYTES # BLD AUTO: 0.04 X10(3) UL (ref 0–1)
IMM GRANULOCYTES NFR BLD: 0.6 %
LYMPHOCYTES # BLD AUTO: 1.15 X10(3) UL (ref 1–4)
LYMPHOCYTES NFR BLD AUTO: 16.6 %
MCH RBC QN AUTO: 29.9 PG (ref 26–34)
MCHC RBC AUTO-ENTMCNC: 32.7 G/DL (ref 31–37)
MCV RBC AUTO: 91.4 FL (ref 80–100)
MONOCYTES # BLD AUTO: 0.8 X10(3) UL (ref 0.1–1)
MONOCYTES NFR BLD AUTO: 11.6 %
NEUTROPHILS # BLD AUTO: 4.69 X10 (3) UL (ref 1.5–7.7)
NEUTROPHILS # BLD AUTO: 4.69 X10(3) UL (ref 1.5–7.7)
NEUTROPHILS NFR BLD AUTO: 67.9 %
OSMOLALITY SERPL CALC.SUM OF ELEC: 296 MOSM/KG (ref 275–295)
PLATELET # BLD AUTO: 219 10(3)UL (ref 150–450)
POTASSIUM SERPL-SCNC: 3.4 MMOL/L (ref 3.5–5.1)
RBC # BLD AUTO: 3.61 X10(6)UL (ref 3.8–5.3)
SODIUM SERPL-SCNC: 142 MMOL/L (ref 136–145)
WBC # BLD AUTO: 6.9 X10(3) UL (ref 4–11)

## 2025-04-15 PROCEDURE — 99239 HOSP IP/OBS DSCHRG MGMT >30: CPT | Performed by: HOSPITALIST

## 2025-04-15 RX ORDER — POTASSIUM CHLORIDE 1500 MG/1
40 TABLET, EXTENDED RELEASE ORAL EVERY 4 HOURS
Status: COMPLETED | OUTPATIENT
Start: 2025-04-15 | End: 2025-04-15

## 2025-04-15 RX ORDER — CEPHALEXIN 500 MG/1
500 CAPSULE ORAL 3 TIMES DAILY
Qty: 12 CAPSULE | Refills: 0 | Status: SHIPPED | OUTPATIENT
Start: 2025-04-15 | End: 2025-04-19

## 2025-04-15 RX ORDER — CEPHALEXIN 500 MG/1
500 CAPSULE ORAL 3 TIMES DAILY
Qty: 12 CAPSULE | Refills: 0 | Status: SHIPPED | OUTPATIENT
Start: 2025-04-15 | End: 2025-04-15

## 2025-04-15 NOTE — PLAN OF CARE
Report given to Sera lopes Bronson.    RN left message with patients son, Darrin, regarding discharge at 2:30pm today. Will call back if has questions

## 2025-04-15 NOTE — CM/SW NOTE
Pt accepted by [facility]. Reserved in [place].     AVS updated w/ their contact info.     [place] [id number] [address] [address]  Phone: [phone]  Fax: [phone]    Skye FARRELL, LSW  Discharge Planner

## 2025-04-15 NOTE — DISCHARGE INSTRUCTIONS
[facility]  [address] [place] [address]  Phone: [phone]  Fax: [phone]

## 2025-04-15 NOTE — CM/SW NOTE
PT eval completed 4/14 with identified need for HHC services. Per PT eval:    HOME SITUATION  Type of Home: Assisted living facility  Home Layout: One level  Lives With: Staff 24 hours    Drives: No     Prior Level of [place]: Per EMR, pt is from an [facility]. Pt is a one person assist. Pt is assisted to the dining room with the wheelchair by staff. Pt can use the RW and ambulates short distance.     Pt is a resident at [facility]. HH referral sent to [facility]. CM/SW will need to confirm agency can accept and provide signed MD orders for HH once available.     CM/SW will remain available for DC planning and/or support.     GILL CalvilloN, CMSRN    [id number]

## 2025-04-15 NOTE — CM/SW NOTE
04/15/25 1156   Discharge disposition   Expected discharge disposition [facility]   Post Acute Care Provider WING LARA   Discharge transportation [facility]     Pt cleared to return to [facility] today. MAEVE spoke with RN Kraig at [place] to make aware. Ok to return. Pt to discharge by BLS at 2:30pm. PCS form completed/printed.     RN to call report at dc: [phone]    [facility]: [phone] or [phone]    Skye FARRELL, LSW  Discharge Planner

## 2025-04-15 NOTE — PROGRESS NOTES
[facility]   part of Located within [facility]     Hospitalist Progress Note     Ophelia Parker Patient Status:  Observation    1935 MRN [id number]   Location [facility] 3NW-A Attending Jorge Luis Servin MD   Hosp Day # 0 PCP Mohinder Perera MD     Subjective:   Doing better     Objective:    Review of Systems:   A comprehensive review of systems was completed; pertinent positive and negatives stated in subjective.  Vital signs:  Temp:  [97.2 °F (36.2 °C)-98.6 °F (37 °C)] 98 °F (36.7 °C)  Pulse:  [75-91] 75  Resp:  [16-20] 16  BP: (102-161)/(61-85) 161/74  SpO2:  [95 %-100 %] 95 %  Physical Exam:    General: No acute distress   Respiratory: no wheezes, no rhonchi  Cardiovascular: S1, S2, RRR  Abdomen: Soft, NT/ND, +BS  Extremities: b/l edema  redness improving     Diagnostic Data:    Labs:  Recent Labs   Lab 253 04/15/25  0538   WBC 7.0 6.9   HGB 11.9* 10.8*   MCV 90.6 91.4   .0 219.0     Recent Labs   Lab 253 04/15/25  0538   * 107*   BUN 25* 16   CREATSERUM 1.30* 1.16*   CA 10.0 9.4   ALB 4.3  --     142   K 3.0* 3.4*   CL 99 102   CO2 30.0 31.0   ALKPHO 105  --    AST 24  --    ALT 11  --    BILT 0.4  --    TP 7.0  --      Estimated Creatinine Clearance: 23.2 mL/min (A) (based on SCr of 1.16 mg/dL (H)).  No results for input(s): \"PTP\", \"INR\" in the last 168 hours.     Microbiology  No results found for this visit on 25.  Imaging: Reviewed in Epic.  Medications: Scheduled Medications[1]    Assessment & Plan:    #Bilateral lower extremity cellulitis with underlying chronic lymphedema  #Chronic lymphedema  -cont IV abx     #Hypokalemia- Replace with electrolyte protocol  #Fall at skilled nursing facility prior to admission  -Fall precautions  -PT OT eval appreciated     #Hypertension- BB  #Hyperlipidemia- Not on any cholesterol medications at home    DC planning          Supplementary Documentation:   Quality:  DVT Mechanical Prophylaxis:   SCDs,    DVT  Pharmacologic Prophylaxis   Medication    heparin (Porcine) 5000 UNIT/ML injection 5,000 Units                Code Status: Full Code  Tyler: External urinary catheter in place  Tyler Duration (in days):   Central line:    ANNALISA: 4/15/2025  At this point Ms. Parker is expected to be discharge to: [facility]     The 21st Century Cures Act makes medical notes like these available to patients in the interest of transparency. Please be advised this is a medical document. Medical documents are intended to carry relevant information, facts as evident, and the clinical opinion of the practitioner. The medical note is intended as peer to peer communication and may appear blunt or direct. It is written in medical language and may contain abbreviations or verbiage that are unfamiliar.                [1]    potassium chloride  40 mEq Oral Q4H    metoprolol succinate ER  25 mg Oral Nightly    memantine  5 mg Oral BID    furosemide  40 mg Oral Daily    losartan  25 mg Oral Daily    nystatin   Topical Q8H    heparin  5,000 Units Subcutaneous 2 times per day    ceFAZolin  2 g Intravenous Q12H

## 2025-04-15 NOTE — DISCHARGE SUMMARY
[facility]     Ophelia Parker Patient Status:  Observation    1935 MRN [id number]   Location [facility] 3NW-A Attending No att. providers found   Hosp Day # 0 PCP Mohinder Perera MD     Date of Admission: 2025  Date of Discharge: 4/15/2025    Discharge Disposition: Assisted Living    Admitting Diagnosis:   Hypokalemia [E87.6]  Dependent edema [R60.9]  Fall, initial encounter [W19.XXXA]  Cellulitis of lower extremity, unspecified laterality [L03.119]    Hospital Discharge Diagnoses:  #Bilateral lower extremity cellulitis with underlying chronic lymphedema  #Chronic lymphedema  -cont abx      #Hypokalemia- Replace with electrolyte protocol  #Fall at skilled nursing facility prior to admission  -Fall precautions  -PT OT eval appreciated      #Hypertension- BB  #Hyperlipidemia- Not on any cholesterol medications at home    Lace+ Score: 57  59-90 High Risk  29-58 Medium Risk  0-28   Low Risk.     Brief Synopsis: Patient improved faster than expected.  Patient converted to oral antibiotics to complete the course as outpatient.  Patient was stable for discharge and discharged in good condition.    Procedures during hospitalization:   None    Lab/Test results pending at Discharge:   None    Consultants:  None    Discharge Medication List:     Discharge Medications        START taking these medications        Instructions Prescription details   cephALEXin 500 MG Caps  Commonly known as: Keflex      Take 1 capsule (500 mg total) by mouth 3 (three) times daily for 4 days.   Stop taking on: 2025  Quantity: 12 capsule  Refills: 0            CONTINUE taking these medications        Instructions Prescription details   acetaminophen 325 MG Tabs  Commonly known as: Tylenol      Take 2 tablets (650 mg total) by mouth every 4 (four) hours as needed for Pain or Fever.   Refills: 0     furosemide 40 MG Tabs  Commonly known as: Lasix      Take 1 tablet (40 mg total) by mouth in the  morning. For edema.   Refills: 0     losartan 25 MG Tabs  Commonly known as: Cozaar      Take 1 tablet (25 mg total) by mouth daily.   Refills: 0     memantine 5 MG Tabs  Commonly known as: Namenda      Take 1 tablet (5 mg total) by mouth in the morning and 1 tablet (5 mg total) before bedtime.   Refills: 0     metoprolol succinate ER 50 MG Tb24  Commonly known as: Toprol XL      Take 0.5 tablets (25 mg total) by mouth nightly.   Refills: 0     nystatin 100,000 Units/g Crea  Commonly known as: Mycostatin      Apply topically every 8 (eight) hours.   Refills: 0     One-Daily Multi Vitamins Tabs      Take 1 tablet by mouth daily.   Refills: 0               Where to Get Your Medications        Please  your prescriptions at the location directed by your doctor or nurse    Bring a paper prescription for each of these medications  cephALEXin 500 MG Caps         Follow-up appointment:   No follow-up provider specified.    -----------------------------------------------------------------------------------------------  PATIENT DISCHARGE INSTRUCTIONS: See electronic chart    Jorge Luis Servin MD 4/15/2025    Time spent: 33 minutes

## 2025-04-15 NOTE — PLAN OF CARE
Problem: PAIN - ADULT  Goal: Verbalizes/displays adequate comfort level or patient's stated pain goal  Description: INTERVENTIONS:- Encourage pt to monitor pain and request assistance- Assess pain using appropriate pain scale- Administer analgesics based on type and severity of pain and evaluate response- Implement non-pharmacological measures as appropriate and evaluate response- Consider cultural and social influences on pain and pain management- Manage/alleviate anxiety- Utilize distraction and/or relaxation techniques- Monitor for opioid side effects- Notify MD/LIP if interventions unsuccessful or patient reports new pain- Anticipate increased pain with activity and pre-medicate as appropriate  Outcome: Progressing     Problem: SAFETY ADULT - FALL  Goal: Free from fall injury  Description: INTERVENTIONS:- Assess pt frequently for physical needs- Identify cognitive and physical deficits and behaviors that affect risk of falls.- [place] fall precautions as indicated by assessment.- Educate pt/family on patient safety including physical limitations- Instruct pt to call for assistance with activity based on assessment- Modify environment to reduce risk of injury- Provide assistive devices as appropriate- Consider OT/PT consult to assist with strengthening/mobility- Encourage toileting schedule  Outcome: Progressing     Problem: DISCHARGE PLANNING  Goal: Discharge to home or other facility with appropriate resources  Description: INTERVENTIONS:- Identify barriers to discharge w/pt and caregiver- Include patient/family/discharge partner in discharge planning- Arrange for needed discharge resources and transportation as appropriate- Identify discharge learning needs (meds, wound care, etc)- Arrange for interpreters to assist at discharge as needed- Consider post-discharge preferences of patient/family/discharge partner- Complete POLST form as appropriate- Assess patient's ability to be responsible for managing  their own health- Refer to Case Management Department for coordinating discharge planning if the patient needs post-hospital services based on physician/LIP order or complex needs related to functional status, cognitive ability or social support system  Outcome: Progressing     Problem: CARDIOVASCULAR - ADULT  Goal: Absence of cardiac arrhythmias or at baseline  Description: INTERVENTIONS:- Continuous cardiac monitoring, monitor vital signs, obtain 12 lead EKG if indicated- Evaluate effectiveness of antiarrhythmic and heart rate control medications as ordered- Initiate emergency measures for life threatening arrhythmias- Monitor electrolytes and administer replacement therapy as ordered  Outcome: Progressing     Problem: METABOLIC/FLUID AND ELECTROLYTES - ADULT  Goal: Electrolytes maintained within normal limits  Description: INTERVENTIONS:- Monitor labs and rhythm and assess patient for signs and symptoms of electrolyte imbalances- Administer electrolyte replacement as ordered- Monitor response to electrolyte replacements, including rhythm and repeat lab results as appropriate- Fluid restriction as ordered- Instruct patient on fluid and nutrition restrictions as appropriate  Outcome: Progressing

## 2025-04-15 NOTE — PROGRESS NOTES
Neuro: Alert and oriented to self.  Vital signs stable  Respiratory: On room air. Continuous pulse oximeter.   Cardiac: Tele-NSR  GI: Abdomen soft, rounded. Denies nausea. Passing gas. Last BM: unknown  : Voids via purewick  Integumentary: redness to bilateral lower extremities with pitting  Drains: external catheter  Pain: controlled with PRN pain medications  Activity: Up with standby assist.  Diet: Cardiac   IV Fluids: saline locked  All appropriate safety measures in place. All questions and concerns addressed.    Sitter in room.

## 2025-05-30 ENCOUNTER — APPOINTMENT (OUTPATIENT)
Dept: CT IMAGING | Facility: HOSPITAL | Age: OVER 89
End: 2025-05-30
Attending: EMERGENCY MEDICINE
Payer: MEDICARE

## 2025-05-30 ENCOUNTER — HOSPITAL ENCOUNTER (EMERGENCY)
Facility: HOSPITAL | Age: OVER 89
Discharge: HOME | End: 2025-05-30
Attending: EMERGENCY MEDICINE
Payer: MEDICARE

## 2025-05-30 VITALS
TEMPERATURE: 97 F | SYSTOLIC BLOOD PRESSURE: 138 MMHG | OXYGEN SATURATION: 100 % | HEART RATE: 90 BPM | RESPIRATION RATE: 16 BRPM | DIASTOLIC BLOOD PRESSURE: 66 MMHG

## 2025-05-30 DIAGNOSIS — S16.1XXA CERVICAL STRAIN, ACUTE, INITIAL ENCOUNTER: ICD-10-CM
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
## 2025-05-30 DIAGNOSIS — S00.03XA CONTUSION OF SCALP, INITIAL ENCOUNTER: Primary | ICD-10-CM

## 2025-05-30 PROCEDURE — 99284 EMERGENCY DEPT VISIT MOD MDM: CPT

## 2025-05-30 PROCEDURE — 70450 CT HEAD/BRAIN W/O DYE: CPT | Performed by: EMERGENCY MEDICINE

## 2025-05-30 PROCEDURE — 72125 CT NECK SPINE W/O DYE: CPT | Performed by: EMERGENCY MEDICINE

## 2025-05-30 NOTE — ED QUICK NOTES
Rounding Completed    Plan of Care reviewed. Waiting for ct scan result.  Elimination needs assessed.  Provided comfort care .    Bed is locked and in lowest position. Call light within reach.

## 2025-05-30 NOTE — ED PROVIDER NOTES
Patient Seen in: [facility] Emergency Department        History  Chief Complaint   Patient presents with    Trauma    Fall     Stated Complaint: unwitnessed fall + thinners    Subjective:   HPI          89-year-old female who presents to the emergency department via EMS due to report that she was found on the floor and assisted back to bed.  Unwitnessed fall.  Reviewing her record she was recent hospitalized on 4/13/2025 for hypokalemia.  The patient says she does not know exactly why she is here.  She does complain of having some head pain and neck discomfort.  She does not complain of any other discomfort.        Objective:     Past Medical History:    Arthritis    Cataract    Epiretinal membrane (ERM) of both eyes    Essential hypertension    Hyperlipidemia    Macular degeneration    PCO (posterior capsular opacification), bilateral    Posterior vitreous detachment    Presbyopia    Pseudophakia of both eyes    S/P YAG capsulotomy              Past Surgical History:   Procedure Laterality Date    Back surgery      Cataract      Cataract extraction w/ intraocular lens  implant, bilateral      Hysterectomy      Tonsillectomy      Yag capsulotomy - od - right eye Right Dr. Coats 10/18/2016    Yag capsulotomy - os - left eye                  Social History     Socioeconomic History    Marital status:    Tobacco Use    Smoking status: Never    Smokeless tobacco: Never   Substance and Sexual Activity    Alcohol use: Not Currently    Drug use: Never     Social Drivers of Health     Food Insecurity: No Food Insecurity (4/14/2025)    NCSS - Food Insecurity     Worried About Running Out of Food in the Last Year: No     Ran Out of Food in the Last Year: No   Transportation Needs: No Transportation Needs (4/14/2025)    NCSS - Transportation     Lack of Transportation: No   Housing Stability: Not At Risk (4/14/2025)    NCSS - Housing/Utilities     Has Housing: Yes     Worried About Losing Housing: No     Unable  to Get Utilities: No                                Physical Exam    ED Triage Vitals   BP 05/30/25 0644 118/72   Pulse 05/30/25 0644 93   Resp 05/30/25 0644 18   Temp 05/30/25 0648 97 °F (36.1 °C)   Temp src 05/30/25 0648 Temporal   SpO2 05/30/25 0644 99 %   O2 Device 05/30/25 0644 None (Room air)       Current Vitals:   Vital Signs  BP: 136/68  Pulse: 92  Resp: 15  Temp: 97 °F (36.1 °C)  Temp src: Temporal  MAP (mmHg): 88    Oxygen Therapy  SpO2: 100 %  O2 Device: None (Room air)            Physical Exam  General: 89-year-old female who tells me that she was sleeping when she was brought here to the emergency department and I woke her sleep disturbing her.  HEENT: She is some discomfort on palpation of her scalp without step-off noted.  There is some neck pain on palpation.  Cervical collar was kept in place.  Right eye is opacified.  Left eye pupil reacts.  Lungs: Equal breath sounds with breathing.  No tachypnea.  No wheezes or rhonchi.  Cardiac: Heart rate of 90 normal S1-2 without murmurs or ectopy  Extremities: Dorsiflexion plantarflexion strength of her toes is intact.  Edema of the lower extremities bilaterally is noted.  No hip pain on palpation.  No shortening of the extremities.    ED Course  Labs Reviewed - No data to display  Narrative  PROCEDURE:  CT SPINE CERVICAL (CPT=72125)     COMPARISON:  [facility] , CT, CT SPINE CERVICAL (CPT=72125), 12/04/2023, 9:02 PM.  [facility] , CT, CT SPINE CERVICAL (CPT=72125), 3/21/2023, 11:18 AM.     INDICATIONS:  Fall with head pain and neck pain     TECHNIQUE:  Noncontrast CT scanning of the cervical spine is performed from the skull base through C7.  Multiplanar reconstructions are generated.  Dose reduction techniques were used. Dose information is transmitted to the ACR (American College of  Radiology) NRDR (National Radiology Data Registry) which includes the Dose Index Registry.     PATIENT STATED HISTORY: (As transcribed by Technologist)  Patient is here with fall.  Head and nek pain         FINDINGS:    The cervical spine shows no evidence of fracture.  The cervical vertebral alignment demonstrates no subluxation. Ligamentous injury cannot be excluded on CT scan. There are normal cervical basilar relationships. The odontoid process is intact. No  destructive osseous lesion is identified. The pre vertebral and joe vertebral soft tissues are normal.  Multilevel degenerative changes with uncovertebral and facet hypertrophy.  There are mild to moderate posterior osteophyte disc complexes at C3-C4,  C4-C5, and C5-C6.  These cause mild effacement of ventral aspect of the thecal sac.  Area of sclerosis within the C4 and C6 vertebral bodies.        CERVICAL DISC LEVELS:  C2-C3:  Bilateral facet hypertrophy.  No spinal canal or neural foraminal stenosis.  C3-C4:  Mild posterior osteophyte disc complex with bilateral facet hypertrophy.  Mild effacement of the ventral aspect of thecal sac.  C4-C5:  Mild posterior osteophyte disc complex with bilateral facet hypertrophy.  There is a mild effacement of the ventral aspect of thecal sac.  No significant neural foraminal stenosis.  C5-C6:  Mild posterior osteophyte disc complex with bilateral facet hypertrophy.  Mild effacement of the ventral aspect of thecal sac.  No significant neural foraminal stenosis.  C6-C7:  Mild posterior osteophyte disc complex.  Bilateral facet hypertrophy.  No spinal canal or neural foraminal stenosis.  C7-T1:  Bilateral facet hypertrophy.  No spinal canal or neural foraminal stenosis.                     Impression  CONCLUSION:    1. No acute fractures.  2. Relatively stable multilevel degenerative changes greatest at the C3-C4 through C5-C6 levels.  3. Stable areas of sclerosis in the C4 and C6 vertebral bodies.             LOCATION:  [id number]        Dictated by (CST): Carolyn Jimenes MD on 5/30/2025 at 8:02 AM      Finalized by (KIMBERLYN): Carolyn Jimenes MD on 5/30/2025 at 8:08 AM          Narrative  PROCEDURE:   CT BRAIN OR HEAD ([id number])     COMPARISON:  [facility] , CT, CT BRAIN OR HEAD ([id number]), 12/04/2023, 9:02 PM.  [facility] , CT, CT BRAIN OR HEAD ([id number]), 3/21/2023, 11:10 AM.     INDICATIONS:  Fall with head pain and neck pain     TECHNIQUE:  Noncontrast CT scanning is performed through the brain. Dose reduction techniques were used. Dose information is transmitted to the ACR (American College of Radiology) NRDR (National Radiology Data Registry) which includes the Dose Index  Registry.     PATIENT STATED HISTORY: (As transcribed by Technologist)  Patient is here with fall. Head and nek pain         FINDINGS:    There is cerebral atrophy with symmetric prominence of the ventricles. There are patchy areas of low attenuation in the periventricular and deep white matter which are nonspecific but most consistent with small vessel ischemic changes. There is no  evidence of hemorrhage, mass, midline shift, or extra-axial fluid collection.  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     The visualized paranasal sinuses show no significant sinus disease.. No evidence of depressed skull fracture.                  Impression  CONCLUSION:  1. No acute intracranial findings  2. Cerebral atrophy with chronic microvascular ischemic changes.           LOCATION:  [id number]        Dictated by (CST): Carolyn Jimenes MD on 5/30/2025 at 7:57 AM      Finalized by (CST): Carolyn Jimenes MD on 5/30/2025 at 7:59 AM                         MDM   Differential diagnosis includes but is not limited to intracranial bleed, cervical spine injury, head contusion, cervical strain.  Patient was sent for CT scan of the brain and cervical spine.  Cervical spine  I reviewed the x-rays and agree with the radiologist report that showed no acute fractures.  Relatively stable multilevel degenerative changes greatest at C3-C4 through C5-C6 level.  Stable areas of sclerosis in the C4 C6 vertebral bodies.  CT of the brain  I reviewed the x-rays and  agree with the radiologist report that showed no acute intracranial findings.  Cerebral atrophy with chronic microvascular ischemic changes.  Patient c-collar was removed.  The patient will be discharged back to the skilled nursing facility.  Take Tylenol for pain control.  Cervical strain and scalp contusion.  I have considered other serious etiologies for this patient's complaints, however the presentation is not consistent with such entities. Patient or caregiver understands the course of events that occurred in the emergency department. Instructed to return to emergency department or contact PCP for persistent, recurrent, or worsening symptoms.  With ambulance transport.  Patient was evaluated and a screening exam was performed.   As a treating physician attending to the patient, I determined, within reasonable clinical confidence and prior to discharge, that an emergency medical condition was not or was no longer present.  There was no indication for further evaluation, treatment or admission on an emergency basis.  Comprehensive verbal and written discharge and follow-up instructions were provided to help prevent relapse or worsening.  Patient was instructed to follow-up with their primary care provider for further evaluation and treatment, but to return immediately to the ER for worsening, concerning, new, changing or persisting symptoms.  I discussed the case with the patient and they had no questions, complaints, or concerns.  Patient felt comfortable going home.  Note to Patient  The 21st Century Cures Act makes medical notes like these available to patients in the interest of transparency. However, be advised this is a medical document and is intended as [id number] communication; it is written in medical language and may appear blunt, direct, or contain abbreviations or verbiage that are unfamiliar. Medical documents are intended to carry relevant information, facts as evident, and the clinical opinion  of the practitioner.  ^^Please note that this report has been produced using speech recognition software and may contain errors related to that system including, but not limited to, errors in grammar, punctuation, and spelling, as well as words and phrases that possibly may have been recognized inappropriately.  If there are any questions or concerns, contact the dictating provider for clarification.  Medical Decision Making      Disposition and Plan     Clinical Impression:  1. Contusion of scalp, initial encounter    2. Cervical strain, acute, initial encounter    3. Fall, initial encounter         Disposition:  Discharge  5/30/2025  8:46 am    Follow-up:  Mohinder Perera MD  [address] [address] [address] [place] [address] [id number] [phone]    Schedule an appointment as soon as possible for a visit in 2 day(s)            Medications Prescribed:  Current Discharge Medication List                Supplementary Documentation:

## 2025-05-30 NOTE — ED INITIAL ASSESSMENT (HPI)
Arrives via EMS from [facility] living with unwitnessed fall , found on floor this am approx 2 hours per SNF staff. SNF staff told EMS + eliquis  no eliquis noted on medlist from SNF

## (undated) DEVICE — PREP BETADINE SOL 5% EYE

## (undated) DEVICE — SUT VICRYL 6-0 S-29 J555G

## (undated) DEVICE — PAD SACRAL PREMIUM 12X12X1

## (undated) DEVICE — STERILE POLYISOPRENE POWDER-FREE SURGICAL GLOVES: Brand: PROTEXIS

## (undated) DEVICE — Device

## (undated) DEVICE — EYE SPEARS: Brand: WECK-CEL

## (undated) DEVICE — SHIELD EYE UNIVERSAL

## (undated) DEVICE — TOWEL SURG OR 17X30IN BLUE

## (undated) DEVICE — APPLICATOR COTTON TIP 3 10/PK

## (undated) DEVICE — STERILE WATER 1000ML BTL

## (undated) DEVICE — HEAD AND NECK CDS-LF: Brand: MEDLINE INDUSTRIES, INC.

## (undated) DEVICE — SLEEVE KENDALL SCD EXPRESS MED

## (undated) DEVICE — TIBURON DRAPE TOWELS: Brand: CONVERTORS

## (undated) DEVICE — 1010 S-DRAPE TOWEL DRAPE 10/BX: Brand: STERI-DRAPE™

## (undated) DEVICE — SPONGE: SPECIALTY EYE SPEAR 50/CS: Brand: MEDICAL ACTION INDUSTRIES

## (undated) DEVICE — HEAD POSITIONER RING - HIGH DENSITY: Brand: DEROYAL

## (undated) DEVICE — COVER LIGHT HANDLE RIGID GREEN